# Patient Record
Sex: MALE | Race: WHITE | NOT HISPANIC OR LATINO | Employment: FULL TIME | URBAN - METROPOLITAN AREA
[De-identification: names, ages, dates, MRNs, and addresses within clinical notes are randomized per-mention and may not be internally consistent; named-entity substitution may affect disease eponyms.]

---

## 2017-01-12 ENCOUNTER — COMMUNICATION - HEALTHEAST (OUTPATIENT)
Dept: INTERNAL MEDICINE | Facility: CLINIC | Age: 27
End: 2017-01-12

## 2017-02-13 ENCOUNTER — COMMUNICATION - HEALTHEAST (OUTPATIENT)
Dept: INTERNAL MEDICINE | Facility: CLINIC | Age: 27
End: 2017-02-13

## 2017-03-13 ENCOUNTER — COMMUNICATION - HEALTHEAST (OUTPATIENT)
Dept: INTERNAL MEDICINE | Facility: CLINIC | Age: 27
End: 2017-03-13

## 2017-04-04 ENCOUNTER — COMMUNICATION - HEALTHEAST (OUTPATIENT)
Dept: INTERNAL MEDICINE | Facility: CLINIC | Age: 27
End: 2017-04-04

## 2017-05-05 ENCOUNTER — COMMUNICATION - HEALTHEAST (OUTPATIENT)
Dept: INTERNAL MEDICINE | Facility: CLINIC | Age: 27
End: 2017-05-05

## 2017-05-08 ENCOUNTER — COMMUNICATION - HEALTHEAST (OUTPATIENT)
Dept: INTERNAL MEDICINE | Facility: CLINIC | Age: 27
End: 2017-05-08

## 2017-06-05 ENCOUNTER — COMMUNICATION - HEALTHEAST (OUTPATIENT)
Dept: INTERNAL MEDICINE | Facility: CLINIC | Age: 27
End: 2017-06-05

## 2017-06-28 ENCOUNTER — COMMUNICATION - HEALTHEAST (OUTPATIENT)
Dept: INTERNAL MEDICINE | Facility: CLINIC | Age: 27
End: 2017-06-28

## 2017-07-24 ENCOUNTER — COMMUNICATION - HEALTHEAST (OUTPATIENT)
Dept: INTERNAL MEDICINE | Facility: CLINIC | Age: 27
End: 2017-07-24

## 2017-08-17 ENCOUNTER — COMMUNICATION - HEALTHEAST (OUTPATIENT)
Dept: INTERNAL MEDICINE | Facility: CLINIC | Age: 27
End: 2017-08-17

## 2017-09-13 ENCOUNTER — COMMUNICATION - HEALTHEAST (OUTPATIENT)
Dept: INTERNAL MEDICINE | Facility: CLINIC | Age: 27
End: 2017-09-13

## 2017-10-05 ENCOUNTER — COMMUNICATION - HEALTHEAST (OUTPATIENT)
Dept: INTERNAL MEDICINE | Facility: CLINIC | Age: 27
End: 2017-10-05

## 2017-11-06 ENCOUNTER — COMMUNICATION - HEALTHEAST (OUTPATIENT)
Dept: INTERNAL MEDICINE | Facility: CLINIC | Age: 27
End: 2017-11-06

## 2017-12-05 ENCOUNTER — COMMUNICATION - HEALTHEAST (OUTPATIENT)
Dept: INTERNAL MEDICINE | Facility: CLINIC | Age: 27
End: 2017-12-05

## 2017-12-11 ENCOUNTER — OFFICE VISIT - HEALTHEAST (OUTPATIENT)
Dept: INTERNAL MEDICINE | Facility: CLINIC | Age: 27
End: 2017-12-11

## 2017-12-11 DIAGNOSIS — I10 HTN (HYPERTENSION): ICD-10-CM

## 2017-12-11 DIAGNOSIS — F90.9 ADHD: ICD-10-CM

## 2017-12-11 RX ORDER — LISINOPRIL 10 MG/1
10 TABLET ORAL DAILY
Qty: 90 TABLET | Refills: 3 | Status: SHIPPED | OUTPATIENT
Start: 2017-12-11 | End: 2021-12-16

## 2017-12-11 ASSESSMENT — MIFFLIN-ST. JEOR: SCORE: 1685.41

## 2018-01-03 ENCOUNTER — COMMUNICATION - HEALTHEAST (OUTPATIENT)
Dept: INTERNAL MEDICINE | Facility: CLINIC | Age: 28
End: 2018-01-03

## 2018-01-30 ENCOUNTER — COMMUNICATION - HEALTHEAST (OUTPATIENT)
Dept: INTERNAL MEDICINE | Facility: CLINIC | Age: 28
End: 2018-01-30

## 2018-02-27 ENCOUNTER — COMMUNICATION - HEALTHEAST (OUTPATIENT)
Dept: INTERNAL MEDICINE | Facility: CLINIC | Age: 28
End: 2018-02-27

## 2018-03-26 ENCOUNTER — COMMUNICATION - HEALTHEAST (OUTPATIENT)
Dept: INTERNAL MEDICINE | Facility: CLINIC | Age: 28
End: 2018-03-26

## 2018-04-23 ENCOUNTER — COMMUNICATION - HEALTHEAST (OUTPATIENT)
Dept: INTERNAL MEDICINE | Facility: CLINIC | Age: 28
End: 2018-04-23

## 2018-05-17 ENCOUNTER — COMMUNICATION - HEALTHEAST (OUTPATIENT)
Dept: INTERNAL MEDICINE | Facility: CLINIC | Age: 28
End: 2018-05-17

## 2018-06-12 ENCOUNTER — COMMUNICATION - HEALTHEAST (OUTPATIENT)
Dept: INTERNAL MEDICINE | Facility: CLINIC | Age: 28
End: 2018-06-12

## 2018-07-09 ENCOUNTER — COMMUNICATION - HEALTHEAST (OUTPATIENT)
Dept: INTERNAL MEDICINE | Facility: CLINIC | Age: 28
End: 2018-07-09

## 2018-08-01 ENCOUNTER — COMMUNICATION - HEALTHEAST (OUTPATIENT)
Dept: INTERNAL MEDICINE | Facility: CLINIC | Age: 28
End: 2018-08-01

## 2018-08-06 ENCOUNTER — COMMUNICATION - HEALTHEAST (OUTPATIENT)
Dept: INTERNAL MEDICINE | Facility: CLINIC | Age: 28
End: 2018-08-06

## 2018-09-04 ENCOUNTER — COMMUNICATION - HEALTHEAST (OUTPATIENT)
Dept: INTERNAL MEDICINE | Facility: CLINIC | Age: 28
End: 2018-09-04

## 2018-10-01 ENCOUNTER — COMMUNICATION - HEALTHEAST (OUTPATIENT)
Dept: INTERNAL MEDICINE | Facility: CLINIC | Age: 28
End: 2018-10-01

## 2018-10-29 ENCOUNTER — COMMUNICATION - HEALTHEAST (OUTPATIENT)
Dept: INTERNAL MEDICINE | Facility: CLINIC | Age: 28
End: 2018-10-29

## 2018-11-27 ENCOUNTER — COMMUNICATION - HEALTHEAST (OUTPATIENT)
Dept: INTERNAL MEDICINE | Facility: CLINIC | Age: 28
End: 2018-11-27

## 2018-12-24 ENCOUNTER — COMMUNICATION - HEALTHEAST (OUTPATIENT)
Dept: INTERNAL MEDICINE | Facility: CLINIC | Age: 28
End: 2018-12-24

## 2019-01-17 ENCOUNTER — OFFICE VISIT - HEALTHEAST (OUTPATIENT)
Dept: INTERNAL MEDICINE | Facility: CLINIC | Age: 29
End: 2019-01-17

## 2019-01-17 DIAGNOSIS — F90.9 ATTENTION DEFICIT HYPERACTIVITY DISORDER (ADHD), UNSPECIFIED ADHD TYPE: ICD-10-CM

## 2019-01-17 DIAGNOSIS — Z79.899 MEDICATION MANAGEMENT: ICD-10-CM

## 2019-01-17 DIAGNOSIS — R03.0 ELEVATED BLOOD PRESSURE READING WITHOUT DIAGNOSIS OF HYPERTENSION: ICD-10-CM

## 2019-01-17 ASSESSMENT — MIFFLIN-ST. JEOR: SCORE: 1699.02

## 2019-01-21 LAB
AMPHETAMINE GC/MS CONF: 4678 NG/ML
INTERPRETATION: NORMAL
MDA (ECSTASY METABOLITE)-BY GC/MS: NEGATIVE NG/ML
MDMA (ECSTASY)-BY GC/MS: NEGATIVE NG/ML
METHAMPHETAMINE GC/MS CONF: NEGATIVE NG/ML
PHENTERMINE-BY GC/MS: NEGATIVE NG/ML
PSEUDOEPHEDRINE/EPHEDRINE-BY GC/MS: NEGATIVE NG/ML

## 2019-01-23 ENCOUNTER — COMMUNICATION - HEALTHEAST (OUTPATIENT)
Dept: INTERNAL MEDICINE | Facility: CLINIC | Age: 29
End: 2019-01-23

## 2019-01-23 LAB
6-MONOACETYLMORPHINE: NOT DETECTED NG/ML
AMPHETAMINES: ABNORMAL NG/ML
BARBITURATES: NEGATIVE NG/ML
BENZODIAZEPINES: NEGATIVE NG/ML
BUPRENORPHINE: NOT DETECTED NG/ML
COCAINE: NEGATIVE NG/ML
CODEINE-6-BETA-GLUCURONIDE: NOT DETECTED NG/ML
CODEINE: NOT DETECTED NG/ML
COMMENT:: NORMAL
CREATININE URINE: 54.6 MG/DL
DIHYDROCODEINE: NOT DETECTED NG/ML
EDDP: NOT DETECTED NG/ML
FENTANYL: NOT DETECTED NG/ML
HYDROCODONE: NOT DETECTED NG/ML
HYDROMORPHONE-3-BETA-GLUCURONIDE: NOT DETECTED NG/ML
HYDROMORPHONE: NOT DETECTED NG/ML
MEPERIDINE: NOT DETECTED NG/ML
METHADONE: NOT DETECTED NG/ML
MORPHINE-6-BETA-GLUCURONIDE: NOT DETECTED NG/ML
MORPHINE: NOT DETECTED NG/ML
N-DESMETHYLTAPENTADOL: NOT DETECTED NG/ML
NALOXONE-3-BETA-GLUCURONIDE: NOT DETECTED NG/ML
NALOXONE: NOT DETECTED NG/ML
NORBUPRENORPHINE GLUCURONIDE: NOT DETECTED NG/ML
NORBUPRENORPHINE: NOT DETECTED NG/ML
NORFENTANYL: NOT DETECTED NG/ML
NORHYDROCODONE: NOT DETECTED NG/ML
NORMEPERIDINE: NOT DETECTED NG/ML
NOROXYCODONE: NOT DETECTED NG/ML
NOROXYMORPHONE: NOT DETECTED NG/ML
NORPROPOXYPHENE: NOT DETECTED NG/ML
O-DESMETHYLTRAMADOL: NOT DETECTED NG/ML
OPIOID INTERPRETATION: ABNORMAL
OXIDANTS URINE: NEGATIVE
OXYCODONE: NOT DETECTED NG/ML
OXYMORPHONE-3-BETA-GLUCURONIDE: NOT DETECTED NG/ML
OXYMORPHONE: NOT DETECTED NG/ML
PH UR STRIP: 5.8 [PH] (ref 5–7)
PHENCYCLIDINE: NEGATIVE NG/ML
PROPOXYPHENE: NOT DETECTED NG/ML
SP GR UR STRIP: 1.01
TAPENTADOL-BETA-GLUCURONIDE: NOT DETECTED NG/ML
TAPENTADOL: NOT DETECTED NG/ML
THC METABOLITE, UR.: NEGATIVE NG/ML
TRAMADOL: NOT DETECTED NG/ML

## 2019-02-20 ENCOUNTER — COMMUNICATION - HEALTHEAST (OUTPATIENT)
Dept: INTERNAL MEDICINE | Facility: CLINIC | Age: 29
End: 2019-02-20

## 2019-03-18 ENCOUNTER — COMMUNICATION - HEALTHEAST (OUTPATIENT)
Dept: INTERNAL MEDICINE | Facility: CLINIC | Age: 29
End: 2019-03-18

## 2019-04-16 ENCOUNTER — COMMUNICATION - HEALTHEAST (OUTPATIENT)
Dept: INTERNAL MEDICINE | Facility: CLINIC | Age: 29
End: 2019-04-16

## 2019-04-16 DIAGNOSIS — F90.9 ATTENTION DEFICIT HYPERACTIVITY DISORDER (ADHD), UNSPECIFIED ADHD TYPE: ICD-10-CM

## 2019-05-14 ENCOUNTER — COMMUNICATION - HEALTHEAST (OUTPATIENT)
Dept: INTERNAL MEDICINE | Facility: CLINIC | Age: 29
End: 2019-05-14

## 2019-05-14 DIAGNOSIS — F90.9 ATTENTION DEFICIT HYPERACTIVITY DISORDER (ADHD), UNSPECIFIED ADHD TYPE: ICD-10-CM

## 2019-06-12 ENCOUNTER — COMMUNICATION - HEALTHEAST (OUTPATIENT)
Dept: INTERNAL MEDICINE | Facility: CLINIC | Age: 29
End: 2019-06-12

## 2019-06-12 DIAGNOSIS — F90.9 ATTENTION DEFICIT HYPERACTIVITY DISORDER (ADHD), UNSPECIFIED ADHD TYPE: ICD-10-CM

## 2019-07-09 ENCOUNTER — COMMUNICATION - HEALTHEAST (OUTPATIENT)
Dept: INTERNAL MEDICINE | Facility: CLINIC | Age: 29
End: 2019-07-09

## 2019-07-09 DIAGNOSIS — F90.9 ATTENTION DEFICIT HYPERACTIVITY DISORDER (ADHD), UNSPECIFIED ADHD TYPE: ICD-10-CM

## 2019-08-06 ENCOUNTER — COMMUNICATION - HEALTHEAST (OUTPATIENT)
Dept: INTERNAL MEDICINE | Facility: CLINIC | Age: 29
End: 2019-08-06

## 2019-08-07 ENCOUNTER — COMMUNICATION - HEALTHEAST (OUTPATIENT)
Dept: INTERNAL MEDICINE | Facility: CLINIC | Age: 29
End: 2019-08-07

## 2019-08-07 DIAGNOSIS — F90.9 ATTENTION DEFICIT HYPERACTIVITY DISORDER (ADHD), UNSPECIFIED ADHD TYPE: ICD-10-CM

## 2019-08-08 ENCOUNTER — COMMUNICATION - HEALTHEAST (OUTPATIENT)
Dept: SCHEDULING | Facility: CLINIC | Age: 29
End: 2019-08-08

## 2019-09-06 ENCOUNTER — COMMUNICATION - HEALTHEAST (OUTPATIENT)
Dept: INTERNAL MEDICINE | Facility: CLINIC | Age: 29
End: 2019-09-06

## 2019-09-06 DIAGNOSIS — F90.9 ATTENTION DEFICIT HYPERACTIVITY DISORDER (ADHD), UNSPECIFIED ADHD TYPE: ICD-10-CM

## 2019-09-09 ENCOUNTER — COMMUNICATION - HEALTHEAST (OUTPATIENT)
Dept: INTERNAL MEDICINE | Facility: CLINIC | Age: 29
End: 2019-09-09

## 2019-09-09 DIAGNOSIS — F90.9 ATTENTION DEFICIT HYPERACTIVITY DISORDER (ADHD), UNSPECIFIED ADHD TYPE: ICD-10-CM

## 2019-10-29 ENCOUNTER — COMMUNICATION - HEALTHEAST (OUTPATIENT)
Dept: SCHEDULING | Facility: CLINIC | Age: 29
End: 2019-10-29

## 2019-10-29 DIAGNOSIS — F90.9 ATTENTION DEFICIT HYPERACTIVITY DISORDER (ADHD), UNSPECIFIED ADHD TYPE: ICD-10-CM

## 2019-11-26 ENCOUNTER — COMMUNICATION - HEALTHEAST (OUTPATIENT)
Dept: INTERNAL MEDICINE | Facility: CLINIC | Age: 29
End: 2019-11-26

## 2019-11-26 DIAGNOSIS — F90.9 ATTENTION DEFICIT HYPERACTIVITY DISORDER (ADHD), UNSPECIFIED ADHD TYPE: ICD-10-CM

## 2019-12-24 ENCOUNTER — COMMUNICATION - HEALTHEAST (OUTPATIENT)
Dept: SCHEDULING | Facility: CLINIC | Age: 29
End: 2019-12-24

## 2019-12-24 DIAGNOSIS — F90.9 ATTENTION DEFICIT HYPERACTIVITY DISORDER (ADHD), UNSPECIFIED ADHD TYPE: ICD-10-CM

## 2020-01-20 ENCOUNTER — COMMUNICATION - HEALTHEAST (OUTPATIENT)
Dept: INTERNAL MEDICINE | Facility: CLINIC | Age: 30
End: 2020-01-20

## 2020-01-20 DIAGNOSIS — F90.9 ATTENTION DEFICIT HYPERACTIVITY DISORDER (ADHD), UNSPECIFIED ADHD TYPE: ICD-10-CM

## 2020-01-22 ENCOUNTER — COMMUNICATION - HEALTHEAST (OUTPATIENT)
Dept: INTERNAL MEDICINE | Facility: CLINIC | Age: 30
End: 2020-01-22

## 2020-01-22 DIAGNOSIS — F90.9 ATTENTION DEFICIT HYPERACTIVITY DISORDER (ADHD), UNSPECIFIED ADHD TYPE: ICD-10-CM

## 2020-02-20 ENCOUNTER — COMMUNICATION - HEALTHEAST (OUTPATIENT)
Dept: INTERNAL MEDICINE | Facility: CLINIC | Age: 30
End: 2020-02-20

## 2020-02-20 DIAGNOSIS — F90.9 ATTENTION DEFICIT HYPERACTIVITY DISORDER (ADHD), UNSPECIFIED ADHD TYPE: ICD-10-CM

## 2020-03-16 ENCOUNTER — COMMUNICATION - HEALTHEAST (OUTPATIENT)
Dept: INTERNAL MEDICINE | Facility: CLINIC | Age: 30
End: 2020-03-16

## 2020-03-16 DIAGNOSIS — F90.9 ATTENTION DEFICIT HYPERACTIVITY DISORDER (ADHD), UNSPECIFIED ADHD TYPE: ICD-10-CM

## 2020-04-15 ENCOUNTER — COMMUNICATION - HEALTHEAST (OUTPATIENT)
Dept: INTERNAL MEDICINE | Facility: CLINIC | Age: 30
End: 2020-04-15

## 2020-04-15 DIAGNOSIS — F90.9 ATTENTION DEFICIT HYPERACTIVITY DISORDER (ADHD), UNSPECIFIED ADHD TYPE: ICD-10-CM

## 2020-04-16 ENCOUNTER — OFFICE VISIT - HEALTHEAST (OUTPATIENT)
Dept: INTERNAL MEDICINE | Facility: CLINIC | Age: 30
End: 2020-04-16

## 2020-04-16 ENCOUNTER — COMMUNICATION - HEALTHEAST (OUTPATIENT)
Dept: INTERNAL MEDICINE | Facility: CLINIC | Age: 30
End: 2020-04-16

## 2020-04-16 DIAGNOSIS — R21 RASH AND NONSPECIFIC SKIN ERUPTION: ICD-10-CM

## 2020-04-16 DIAGNOSIS — I10 ESSENTIAL HYPERTENSION: ICD-10-CM

## 2020-04-16 DIAGNOSIS — F90.9 ATTENTION DEFICIT HYPERACTIVITY DISORDER (ADHD), UNSPECIFIED ADHD TYPE: ICD-10-CM

## 2020-04-16 LAB
AMPHETAMINES UR QL SCN: ABNORMAL
BARBITURATES UR QL: ABNORMAL
BENZODIAZ UR QL: ABNORMAL
CANNABINOIDS UR QL SCN: ABNORMAL
COCAINE UR QL: ABNORMAL
CREAT UR-MCNC: 122.8 MG/DL
OPIATES UR QL SCN: ABNORMAL
OXYCODONE UR QL: ABNORMAL
PCP UR QL SCN: ABNORMAL

## 2020-05-15 ENCOUNTER — COMMUNICATION - HEALTHEAST (OUTPATIENT)
Dept: INTERNAL MEDICINE | Facility: CLINIC | Age: 30
End: 2020-05-15

## 2020-05-15 DIAGNOSIS — F90.9 ATTENTION DEFICIT HYPERACTIVITY DISORDER (ADHD), UNSPECIFIED ADHD TYPE: ICD-10-CM

## 2020-06-16 ENCOUNTER — COMMUNICATION - HEALTHEAST (OUTPATIENT)
Dept: INTERNAL MEDICINE | Facility: CLINIC | Age: 30
End: 2020-06-16

## 2020-06-16 DIAGNOSIS — F90.9 ATTENTION DEFICIT HYPERACTIVITY DISORDER (ADHD), UNSPECIFIED ADHD TYPE: ICD-10-CM

## 2020-07-14 ENCOUNTER — COMMUNICATION - HEALTHEAST (OUTPATIENT)
Dept: SCHEDULING | Facility: CLINIC | Age: 30
End: 2020-07-14

## 2020-07-14 DIAGNOSIS — F90.9 ATTENTION DEFICIT HYPERACTIVITY DISORDER (ADHD), UNSPECIFIED ADHD TYPE: ICD-10-CM

## 2020-08-13 ENCOUNTER — COMMUNICATION - HEALTHEAST (OUTPATIENT)
Dept: INTERNAL MEDICINE | Facility: CLINIC | Age: 30
End: 2020-08-13

## 2020-08-13 DIAGNOSIS — F90.9 ATTENTION DEFICIT HYPERACTIVITY DISORDER (ADHD), UNSPECIFIED ADHD TYPE: ICD-10-CM

## 2020-09-14 ENCOUNTER — COMMUNICATION - HEALTHEAST (OUTPATIENT)
Dept: INTERNAL MEDICINE | Facility: CLINIC | Age: 30
End: 2020-09-14

## 2020-09-14 DIAGNOSIS — F90.9 ATTENTION DEFICIT HYPERACTIVITY DISORDER (ADHD), UNSPECIFIED ADHD TYPE: ICD-10-CM

## 2020-10-13 ENCOUNTER — COMMUNICATION - HEALTHEAST (OUTPATIENT)
Dept: INTERNAL MEDICINE | Facility: CLINIC | Age: 30
End: 2020-10-13

## 2020-10-13 DIAGNOSIS — F90.9 ATTENTION DEFICIT HYPERACTIVITY DISORDER (ADHD), UNSPECIFIED ADHD TYPE: ICD-10-CM

## 2020-11-12 ENCOUNTER — COMMUNICATION - HEALTHEAST (OUTPATIENT)
Dept: INTERNAL MEDICINE | Facility: CLINIC | Age: 30
End: 2020-11-12

## 2020-11-12 DIAGNOSIS — F90.9 ATTENTION DEFICIT HYPERACTIVITY DISORDER (ADHD), UNSPECIFIED ADHD TYPE: ICD-10-CM

## 2020-12-14 ENCOUNTER — COMMUNICATION - HEALTHEAST (OUTPATIENT)
Dept: INTERNAL MEDICINE | Facility: CLINIC | Age: 30
End: 2020-12-14

## 2020-12-14 DIAGNOSIS — F90.9 ATTENTION DEFICIT HYPERACTIVITY DISORDER (ADHD), UNSPECIFIED ADHD TYPE: ICD-10-CM

## 2020-12-15 ENCOUNTER — OFFICE VISIT - HEALTHEAST (OUTPATIENT)
Dept: FAMILY MEDICINE | Facility: CLINIC | Age: 30
End: 2020-12-15

## 2020-12-15 DIAGNOSIS — F90.9 ATTENTION DEFICIT HYPERACTIVITY DISORDER (ADHD), UNSPECIFIED ADHD TYPE: ICD-10-CM

## 2020-12-15 DIAGNOSIS — R21 RASH AND NONSPECIFIC SKIN ERUPTION: ICD-10-CM

## 2020-12-15 RX ORDER — TRIAMCINOLONE ACETONIDE 1 MG/G
CREAM TOPICAL
Qty: 30 G | Refills: 2 | Status: SHIPPED | OUTPATIENT
Start: 2020-12-15

## 2021-01-15 ENCOUNTER — COMMUNICATION - HEALTHEAST (OUTPATIENT)
Dept: ADMINISTRATIVE | Facility: CLINIC | Age: 31
End: 2021-01-15

## 2021-01-15 DIAGNOSIS — F90.9 ATTENTION DEFICIT HYPERACTIVITY DISORDER (ADHD), UNSPECIFIED ADHD TYPE: ICD-10-CM

## 2021-01-16 ENCOUNTER — COMMUNICATION - HEALTHEAST (OUTPATIENT)
Dept: SCHEDULING | Facility: CLINIC | Age: 31
End: 2021-01-16

## 2021-01-18 ENCOUNTER — COMMUNICATION - HEALTHEAST (OUTPATIENT)
Dept: INTERNAL MEDICINE | Facility: CLINIC | Age: 31
End: 2021-01-18

## 2021-01-18 DIAGNOSIS — F90.9 ATTENTION DEFICIT HYPERACTIVITY DISORDER (ADHD), UNSPECIFIED ADHD TYPE: ICD-10-CM

## 2021-02-15 ENCOUNTER — COMMUNICATION - HEALTHEAST (OUTPATIENT)
Dept: INTERNAL MEDICINE | Facility: CLINIC | Age: 31
End: 2021-02-15

## 2021-02-15 ENCOUNTER — COMMUNICATION - HEALTHEAST (OUTPATIENT)
Dept: FAMILY MEDICINE | Facility: CLINIC | Age: 31
End: 2021-02-15

## 2021-02-15 DIAGNOSIS — F90.9 ATTENTION DEFICIT HYPERACTIVITY DISORDER (ADHD), UNSPECIFIED ADHD TYPE: ICD-10-CM

## 2021-02-25 ENCOUNTER — COMMUNICATION - HEALTHEAST (OUTPATIENT)
Dept: FAMILY MEDICINE | Facility: CLINIC | Age: 31
End: 2021-02-25

## 2021-03-16 ENCOUNTER — COMMUNICATION - HEALTHEAST (OUTPATIENT)
Dept: INTERNAL MEDICINE | Facility: CLINIC | Age: 31
End: 2021-03-16

## 2021-03-16 ENCOUNTER — COMMUNICATION - HEALTHEAST (OUTPATIENT)
Dept: ADMINISTRATIVE | Facility: CLINIC | Age: 31
End: 2021-03-16

## 2021-03-16 DIAGNOSIS — F90.9 ATTENTION DEFICIT HYPERACTIVITY DISORDER (ADHD), UNSPECIFIED ADHD TYPE: ICD-10-CM

## 2021-04-15 ENCOUNTER — COMMUNICATION - HEALTHEAST (OUTPATIENT)
Dept: INTERNAL MEDICINE | Facility: CLINIC | Age: 31
End: 2021-04-15

## 2021-04-15 DIAGNOSIS — F90.9 ATTENTION DEFICIT HYPERACTIVITY DISORDER (ADHD), UNSPECIFIED ADHD TYPE: ICD-10-CM

## 2021-05-07 ENCOUNTER — COMMUNICATION - HEALTHEAST (OUTPATIENT)
Dept: INTERNAL MEDICINE | Facility: CLINIC | Age: 31
End: 2021-05-07

## 2021-05-07 DIAGNOSIS — Z00.00 ROUTINE HEALTH MAINTENANCE: ICD-10-CM

## 2021-05-10 ENCOUNTER — AMBULATORY - HEALTHEAST (OUTPATIENT)
Dept: NURSING | Facility: CLINIC | Age: 31
End: 2021-05-10

## 2021-05-17 ENCOUNTER — COMMUNICATION - HEALTHEAST (OUTPATIENT)
Dept: FAMILY MEDICINE | Facility: CLINIC | Age: 31
End: 2021-05-17

## 2021-05-18 ENCOUNTER — COMMUNICATION - HEALTHEAST (OUTPATIENT)
Dept: INTERNAL MEDICINE | Facility: CLINIC | Age: 31
End: 2021-05-18

## 2021-05-18 DIAGNOSIS — F90.9 ATTENTION DEFICIT HYPERACTIVITY DISORDER (ADHD), UNSPECIFIED ADHD TYPE: ICD-10-CM

## 2021-05-20 ENCOUNTER — RECORDS - HEALTHEAST (OUTPATIENT)
Dept: ADMINISTRATIVE | Facility: OTHER | Age: 31
End: 2021-05-20

## 2021-05-20 ENCOUNTER — OFFICE VISIT - HEALTHEAST (OUTPATIENT)
Dept: FAMILY MEDICINE | Facility: CLINIC | Age: 31
End: 2021-05-20

## 2021-05-20 DIAGNOSIS — Z79.899 CONTROLLED SUBSTANCE AGREEMENT SIGNED: ICD-10-CM

## 2021-05-20 DIAGNOSIS — F90.9 ATTENTION DEFICIT HYPERACTIVITY DISORDER (ADHD), UNSPECIFIED ADHD TYPE: ICD-10-CM

## 2021-05-20 DIAGNOSIS — I10 BENIGN ESSENTIAL HYPERTENSION: ICD-10-CM

## 2021-05-20 DIAGNOSIS — Z11.4 SCREENING FOR HIV (HUMAN IMMUNODEFICIENCY VIRUS): ICD-10-CM

## 2021-05-20 DIAGNOSIS — Z11.59 NEED FOR HEPATITIS C SCREENING TEST: ICD-10-CM

## 2021-05-20 LAB
AMPHETAMINES UR QL SCN: ABNORMAL
BARBITURATES UR QL: ABNORMAL
BENZODIAZ UR QL: ABNORMAL
CANNABINOIDS UR QL SCN: ABNORMAL
COCAINE UR QL: ABNORMAL
CREAT UR-MCNC: 17.8 MG/DL
METHADONE UR QL SCN: ABNORMAL
OPIATES UR QL SCN: ABNORMAL
OXYCODONE UR QL: ABNORMAL
PCP UR QL SCN: ABNORMAL

## 2021-05-20 RX ORDER — DEXTROAMPHETAMINE SACCHARATE, AMPHETAMINE ASPARTATE MONOHYDRATE, DEXTROAMPHETAMINE SULFATE AND AMPHETAMINE SULFATE 7.5; 7.5; 7.5; 7.5 MG/1; MG/1; MG/1; MG/1
30 CAPSULE, EXTENDED RELEASE ORAL DAILY
Qty: 30 CAPSULE | Refills: 0 | Status: SHIPPED | OUTPATIENT
Start: 2021-05-28 | End: 2021-07-02

## 2021-05-20 RX ORDER — DEXTROAMPHETAMINE SACCHARATE, AMPHETAMINE ASPARTATE, DEXTROAMPHETAMINE SULFATE AND AMPHETAMINE SULFATE 1.25; 1.25; 1.25; 1.25 MG/1; MG/1; MG/1; MG/1
5 TABLET ORAL DAILY
Qty: 30 TABLET | Refills: 0 | Status: SHIPPED | OUTPATIENT
Start: 2021-05-28 | End: 2021-07-02

## 2021-05-20 RX ORDER — AMLODIPINE BESYLATE 10 MG/1
10 TABLET ORAL DAILY
Qty: 30 TABLET | Refills: 1 | Status: SHIPPED | OUTPATIENT
Start: 2021-05-20 | End: 2021-12-16 | Stop reason: SINTOL

## 2021-05-27 VITALS
TEMPERATURE: 98.2 F | OXYGEN SATURATION: 98 % | SYSTOLIC BLOOD PRESSURE: 150 MMHG | HEART RATE: 93 BPM | DIASTOLIC BLOOD PRESSURE: 88 MMHG

## 2021-05-27 VITALS — WEIGHT: 173.1 LBS

## 2021-05-27 NOTE — TELEPHONE ENCOUNTER
Received a vocera call regarding this patient- he is out of his adderal and wanted to know if he could get it today.     Advised that PCP is not here today- will not send to covering provider as this is an early fill request- last filled on 3/22/19- should have enough to last through 4/20/19.   PCP to advise tomorrow

## 2021-05-27 NOTE — TELEPHONE ENCOUNTER
Controlled Substance Refill Request  Medication Name:   Requested Prescriptions     Pending Prescriptions Disp Refills     dextroamphetamine-amphetamine (ADDERALL) 30 mg Tab 30 tablet 0     Sig: Take 30 mg by mouth daily.     dextroamphetamine-amphetamine (ADDERALL) 5 mg Tab tablet 30 tablet 0     Sig: Take 1 tablet by mouth daily.     Date Last Fill:  3/22/2019  [facility]     Submit electronically to pharmacy  Controlled Substance Agreement Date Scanned:   Encounter-Level CSA Scan Date - 12/11/2017:    Scan on 12/15/2017  7:21 AM (below)             Encounter-Level CSA Scan Date - 09/22/2016:    Scan on 9/26/2016  1:38 PM (below)             Encounter-Level CSA Scan Date - 11/05/2015:    Scan on 11/9/2015  1:40 PM (below)         Last office visit with prescriber/PCP: 1/17/2019 Pantera Rooney MD OR same dept: 1/17/2019 Pantera Rooney MD OR same specialty: 1/17/2019 Pantera Rooney MD  Last physical: Visit date not found Last MTM visit: Visit date not found

## 2021-05-28 NOTE — TELEPHONE ENCOUNTER
Who is calling:  Patient   Reason for Call:  Called in yesterday hoping the orders could be signed overnight. Please review.   Date of last appointment with primary care: NA  Okay to leave a detailed message: No

## 2021-05-28 NOTE — TELEPHONE ENCOUNTER
Controlled Substance Refill Request  Medication:   Requested Prescriptions     Pending Prescriptions Disp Refills     dextroamphetamine-amphetamine (ADDERALL) 30 mg Tab 30 tablet 0     Sig: Take 30 mg by mouth daily.     dextroamphetamine-amphetamine (ADDERALL) 5 mg Tab tablet 30 tablet 0     Sig: Take 1 tablet by mouth daily.     Date Last Fill: 4/17/19  [id number]   Submit electronically to pharmacy  Controlled Substance Agreement on File:   Encounter-Level CSA Scan Date - 12/11/2017:    Scan on 12/15/2017  7:21 AM (below)             Encounter-Level CSA Scan Date - 09/22/2016:    Scan on 9/26/2016  1:38 PM (below)             Encounter-Level CSA Scan Date - 11/05/2015:    Scan on 11/9/2015  1:40 PM (below)         Last office visit: Last office visit pertaining to requested medication was 1/17/19.

## 2021-05-28 NOTE — TELEPHONE ENCOUNTER
Refill Request  Did you contact pharmacy: Yes  Medication name: Adderall 30 mg tab and Adderall 5 mg   Requested Prescriptions      No prescriptions requested or ordered in this encounter     Who prescribed the medication: Dr. Rooney  [facility]  Is patient out of medication: No.  3  days left  Patient notified refills processed in 72 hours:  yes  Okay to leave a detailed message: yes

## 2021-05-29 NOTE — TELEPHONE ENCOUNTER
Prescription Monitoring Program activity reviewed with no discrepancies noted.      Last fill per : 5/15  Quantity/days supply: 30/30    Controlled Substance Agreement on file: Yes  Date: 1/17/19    Last office visit with provider:  1/17/2019 Pantera Rooney MD    Please advise.  Fernanda Dickerson CMA ([facility])

## 2021-05-29 NOTE — TELEPHONE ENCOUNTER
Patient has two tablets left.        Controlled Substance Refill Request  Medication Name:   Requested Prescriptions     Pending Prescriptions Disp Refills     dextroamphetamine-amphetamine (ADDERALL) 30 mg Tab 30 tablet 0     Sig: Take 30 mg by mouth daily.     dextroamphetamine-amphetamine (ADDERALL) 5 mg Tab tablet 30 tablet 0     Sig: Take 1 tablet by mouth daily.     Date Last Fill: 5/15/19  [id number]    Submit electronically to pharmacy  Controlled Substance Agreement Date Scanned:   Encounter-Level CSA Scan Date - 12/11/2017:    Scan on 12/15/2017  7:21 AM (below)             Encounter-Level CSA Scan Date - 09/22/2016:    Scan on 9/26/2016  1:38 PM (below)             Encounter-Level CSA Scan Date - 11/05/2015:    Scan on 11/9/2015  1:40 PM (below)         Last office visit with prescriber/PCP: 1/17/2019 Pantera Rooney MD OR same dept: 1/17/2019 Pantera Rooney MD OR same specialty: 1/17/2019 Pantera Rooney MD  Last physical: Visit date not found Last MTM visit: Visit date not found

## 2021-05-30 NOTE — TELEPHONE ENCOUNTER
Prescription Monitoring Program activity reviewed with no discrepancies noted.      Last fill per : 6/12/19  Quantity/days supply: #30, 30 day supply    Controlled Substance Agreement on file: No  Date:     Last office visit with provider:  1/17/2019 Pantera Rooney MD    Please advise.    Cynthia Granger LPN

## 2021-05-30 NOTE — TELEPHONE ENCOUNTER
Controlled Substance Refill Request  Medication Name:   Requested Prescriptions     Pending Prescriptions Disp Refills     dextroamphetamine-amphetamine (ADDERALL) 30 mg Tab 30 tablet 0     Sig: Take 30 mg by mouth daily.     dextroamphetamine-amphetamine (ADDERALL) 5 mg Tab tablet 30 tablet 0     Sig: Take 1 tablet by mouth daily.     Date Last Fill: 6/12/2019  Pharmacy: [facility]      Submit electronically to pharmacy  Controlled Substance Agreement Date Scanned:   Encounter-Level CSA Scan Date - 12/11/2017:    Scan on 12/15/2017  7:21 AM (below)             Encounter-Level CSA Scan Date - 09/22/2016:    Scan on 9/26/2016  1:38 PM (below)             Encounter-Level CSA Scan Date - 11/05/2015:    Scan on 11/9/2015  1:40 PM (below)         Last office visit with prescriber/PCP: 1/17/2019 Pantera Rooney MD OR same dept: 1/17/2019 Pantera Rooney MD OR same specialty: 1/17/2019 Pantera Rooney MD  Last physical: Visit date not found Last MTM visit: Visit date not found

## 2021-05-31 VITALS — BODY MASS INDEX: 22.26 KG/M2 | HEIGHT: 71 IN | WEIGHT: 159 LBS

## 2021-05-31 NOTE — TELEPHONE ENCOUNTER
RN Assessment/Reason for Call:   Okay to leave Detailed Message  Patient calling in, script sent to [facility] ;on vacation in  [place].  Dr Rooney sent new script to [facility] pharmacy in [place]?  Nurse unable to see where script was sent to today..    [facility] [address] [facility] [address] [phone]  Wants it sent to if we are able to send to [place].  If not; plans to fill local and have parents  and mail it to him.    RN Action/Disposition:  Will send message to Dr Rooney  Agrees to plan.     Diana Patel RN    [facility] Triage/med refill  8/8/2019  6:08 PM

## 2021-05-31 NOTE — TELEPHONE ENCOUNTER
Linda Rodas for refill requested?  Refill has been set up for you to review.    Prescription Monitoring Program activity reviewed with no discrepancies noted.      Last fill per : 07/10/19  Quantity/days supply: Adderall 5 mg #30 for a 30 day supply, Adderall 30 mg #30 for 30 day supply     Controlled Substance Agreement on file: Yes  Date: 12/15/17    Last office visit with provider:  1/17/2019 Pantera Rooney MD    Please advise.    Michelle PORTILLO CMA/MIRIAN....................10:16 AM

## 2021-05-31 NOTE — TELEPHONE ENCOUNTER
Medication Question or Clarification  Who is calling: Patient  What medication are you calling about? (include dose and sig)    Disp Refills Start End    dextroamphetamine-amphetamine (ADDERALL) 30 mg Tab 30 tablet 0 7/10/2019 7/9/2020    Sig - Route: Take 30 mg by mouth daily. - Oral    Sent to pharmacy as: dextroamphetamine-amphetamine (ADDERALL) 30 mg Tab    Earliest Fill Date: 7/10/2019       Disp Refills Start End    dextroamphetamine-amphetamine (ADDERALL) 5 mg Tab tablet 30 tablet 0 7/10/2019 7/9/2020    Sig - Route: Take 1 tablet by mouth daily. - Oral    Sent to pharmacy as: dextroamphetamine-amphetamine (ADDERALL) 5 mg Tab tablet    Earliest Fill Date: 7/10/2019    Notes to Pharmacy: Take along with 30mg tab to equal 35 mg daily      Who prescribed the medication?: Pantera Rooney MD  What is your question/concern?: Patient understands he is calling early regarding his medications.  Patient states he is out of town on a family vacation and will need his medication refilled before returning home on 8/15/19.  Patient is questioning if his refills can be sent to a pharmacy in [place] near where he is staying?  Pharmacy: [facility], [place] ([address])  Okay to leave a detailed message?: Yes  [phone]  Site CMT - Please call the pharmacy to obtain any additional needed information.

## 2021-05-31 NOTE — TELEPHONE ENCOUNTER
Please OK to go to his local pharmacy, can not be filled in AZEB Dickerson CMA ([facility])

## 2021-05-31 NOTE — TELEPHONE ENCOUNTER
Are you Ok with the change?  Did put it for here.    Fernanda Dickerson CMA ([facility])

## 2021-05-31 NOTE — TELEPHONE ENCOUNTER
Medication Question or Clarification  Who is calling: Patient  What medication are you calling about? (include dose and sig)     dextroamphetamine-amphetamine (ADDERALL) 30 mg Tab 30 tablet 0 8/7/2019 8/6/2020    Sig - Route: Take 30 mg by mouth daily. - Oral    Sent to pharmacy as: dextroamphetamine-amphetamine (ADDERALL) 30 mg Tab    Earliest Fill Date: 8/7/2019    Notes to Pharmacy: Take along with 5 mg to equal 35 mg daily    E-Prescribing Status: Receipt confirmed by pharmacy (8/7/2019 10:27 AM CDT)        Who prescribed the medication?: Dr. Rooney  What is your question/concern?: The patient is unable to get his script from [facility]/pharmacy #[id number] - [place], [place] - [address]  and would like the script sent to [facility] #[id number] - [address] AT [place] [address] & [facility]. He states that they would not fill it due to the doctor being from out of state.   Pharmacy: [facility] #[id number] [address] AT [place] [address] & [address]   Okay to leave a detailed message?: Yes  Site CMT - Please call the pharmacy to obtain any additional needed information.

## 2021-05-31 NOTE — TELEPHONE ENCOUNTER
Spoke with the patient and relayed message below from Dr. Rooney.  Patient will check with the pharmacy in [place] and get back to us with their response regarding a prescription.    Michelle PORTILLO CMA/MIRIAN....................9:38 AM

## 2021-05-31 NOTE — TELEPHONE ENCOUNTER
Who is calling:  Patient  Reason for Call:    Patient states [facility] in [place], [place] will accept the below prescripitons.  [facility]   [address]  [facility] [address]  [phone]  Date of last appointment with primary care: 1/17/2019  Okay to leave a detailed message: Yes

## 2021-05-31 NOTE — TELEPHONE ENCOUNTER
I would be okay with that but I am not sure that a [place] pharmacy is going to accept a prescription from us for this medication.

## 2021-06-01 NOTE — TELEPHONE ENCOUNTER
Prescription Monitoring Program activity reviewed with no discrepancies noted.      Last fill per : 8/9  Quantity/days supply: 30/30    Controlled Substance Agreement on file: Yes  Date: 1/17/19    Last office visit with provider:  1/17/2019 Pantera Rooney MD    Please advise.    Fernanda Dickerson CMA ([facility])

## 2021-06-01 NOTE — TELEPHONE ENCOUNTER
Prescription set up in a separate encounter to be signed. Patient has been notified.    Brittni Allison, CMA

## 2021-06-01 NOTE — TELEPHONE ENCOUNTER
Medication Question or Clarification  Who is calling: Patient  What medication are you calling about? (include dose and sig)    Disp Refills Start End    dextroamphetamine-amphetamine (ADDERALL) 30 mg Tab 30 tablet 0 9/6/2019 9/5/2020    Sig - Route: Take 30 mg by mouth daily. - Oral    Sent to pharmacy as: dextroamphetamine-amphetamine (ADDERALL) 30 mg Tab    Earliest Fill Date: 9/6/2019    Notes to Pharmacy: Take along with 5 mg to equal 35 mg daily    E-Prescribing Status: Receipt confirmed by pharmacy (9/6/2019  4:41 PM CDT)      Who prescribed the medication?: Pantera Rooney MD   What is your question/concern?: Patient stated he would like to be prescribed the XR capsule instead of the immediate release because of the shortage from the other pharmacies.  [id number]  Okay to leave a detailed message?: Yes  Site CMT - Please call the pharmacy to obtain any additional needed information.

## 2021-06-01 NOTE — TELEPHONE ENCOUNTER
Waiting on the response from pharmacist.  was done on this medication.    Prescription Monitoring Program activity reviewed with no discrepancies noted.      Last fill per : 08/09/19  Quantity/days supply: #30 for a 30 day supply    Controlled Substance Agreement on file: No  Date:     Last office visit with provider:  1/17/2019 Pantera Rooney MD    Please advise.

## 2021-06-01 NOTE — TELEPHONE ENCOUNTER
I assume he is talking about a long-term change rather than short term change.  If that is the case I am okay with it but would ask that you check with David to adjust the dose to the XR form.

## 2021-06-01 NOTE — TELEPHONE ENCOUNTER
Medication Question or Clarification  Who is calling: Patient  What medication are you calling about? (include dose and sig)    Disp Refills Start End    dextroamphetamine-amphetamine (ADDERALL) 30 mg Tab 30 tablet 0 9/6/2019 9/5/2020    Sig - Route: Take 30 mg by mouth daily. - Oral    Sent to pharmacy as: dextroamphetamine-amphetamine (ADDERALL) 30 mg Tab    Earliest Fill Date: 9/6/2019    Notes to Pharmacy: Take along with 5 mg to equal 35 mg daily    E-Prescribing Status: Receipt confirmed by pharmacy (9/6/2019  4:41 PM CDT)      Who prescribed the medication?: Pantera Rooney MD   What is your question/concern?: patient stated the extended release medication needs to be sent to a different pharmacy from where it was originally sent.  Pharmacy: [facility] ([facility])  Okay to leave a detailed message?: Yes  Patient is requesting a call when this has been addressed so he may follow up with the pharmacy.  Site CMT - Please call the pharmacy to obtain any additional needed information.

## 2021-06-01 NOTE — TELEPHONE ENCOUNTER
Dr. Rooney,    Current RX is on recall. Please advise if you'd like to send in an alternative or have patient find RX at another pharmacy.    Michelle GOFF LPN .......... 4:05 PM  09/06/19    
I would rather he not change medication.  
Medication Request  Medication name:   dextroamphetamine-amphetamine (ADDERALL) 30 mg Tab 30 tablet 0 9/6/2019 9/5/2020 No   Sig - Route: Take 30 mg by mouth daily. - Oral   Patient requesting alternative or to have prescription sent to another Pharmacy  Pharmacy Name and Location: [facility])  Reason for request: This formulation and dose is on a recall.  When did you use medication last?:  1.17.2019  Patient offered appointment:  patient declined  Okay to leave a detailed message: yes    
Spoke with the patient and relayed message below from Dr. Rooney.  Patient asked if we would be able to send refill to the [facility] in [place]. Refill request was sent up in a separate encounter for Dr. Rooney to review.  He has authorized the prescription as requested.    Called the patient and let him know that the prescription was sent to the requested pharmacy.  He verbalized understanding and had no further questions at this time.  Michelle PORTILLO CMA/MIRIAN....................4:54 PM    
(3) adequate

## 2021-06-01 NOTE — TELEPHONE ENCOUNTER
Patient is out of medication and has been for 3 days.  Please send this prescriptoin as soon as possible.   Please call Sergio when this is done.        Medication Request  Medication name: Adderall XR 30 mg, one daily  Pharmacy Name and Location: [facility]  Reason for request: Patient had to change medication due to shortage of Adderall 30 mg.  When did you use medication last?:  3 days ago  Patient offered appointment:  patient declined  Okay to leave a detailed message: yes    There is a string of messages regarding this issue.  Patient requested Adderal 30 mg last week.  The Rx was sent but the pharmacy was out of stock.  The Rx was then sent to another pharmacy but they were out of stock.  Patient requested the Adderall XR 30 mg which pharmacy does have.      Dr. Rooney wanted Swapnil Livingston to weigh on this medication change.  Her note is as follows:  There is a different kinetic profile between the immediate release and extended release forms of Adderall, I do not see any specific concerns about switching this for his 30 mg dose, however does he use the smaller 5 mg immediate release dose in the afternoon?  He may benefit from XR formulation of the 30 mg Adderall and remain on the 5 mg immediate release if he takes this in the afternoon.  On the extended release formulation he also may not require immediate release 5 mg tab to prevent afternoon crashing.     Patient was given the information from Swapnil regarding he may not need the immediate release 5 mg tablet.  Patient understand this.    Patient is frustrated and hopes that Dr. Rooney will send in an order today for Adderall XR 30 mg, one daily to [facility] in [place].

## 2021-06-01 NOTE — TELEPHONE ENCOUNTER
Patient will be out of this medication on 9/08/2019 and is asking for it to be sent in today.        Controlled Substance Refill Request  Medication Name:   Requested Prescriptions     Pending Prescriptions Disp Refills     dextroamphetamine-amphetamine (ADDERALL) 5 mg Tab tablet 30 tablet 0     Sig: Take 1 tablet by mouth daily.     dextroamphetamine-amphetamine (ADDERALL) 30 mg Tab 30 tablet 0     Sig: Take 30 mg by mouth daily.     Date Last Fill: 8/09/2019  Pharmacy: [facility] #[id number]      Submit electronically to pharmacy  Controlled Substance Agreement Date Scanned:   Encounter-Level CSA Scan Date - 12/11/2017:    Scan on 12/15/2017  7:21 AM (below)             Encounter-Level CSA Scan Date - 09/22/2016:    Scan on 9/26/2016  1:38 PM (below)             Encounter-Level CSA Scan Date - 11/05/2015:    Scan on 11/9/2015  1:40 PM (below)         Last office visit with prescriber/PCP: 1/17/2019 Pantera Rooney MD OR same dept: 1/17/2019 Pantera Rooney MD OR same specialty: 1/17/2019 Pantera Rooney MD  Last physical: Visit date not found Last MTM visit: Visit date not found

## 2021-06-02 VITALS — BODY MASS INDEX: 22.68 KG/M2 | WEIGHT: 162 LBS | HEIGHT: 71 IN

## 2021-06-02 NOTE — TELEPHONE ENCOUNTER
Controlled Substance Refill Request  Medication Name:   Requested Prescriptions     Pending Prescriptions Disp Refills     dextroamphetamine-amphetamine (ADDERALL XR) 30 MG 24 hr capsule 30 capsule 0     Sig: Take 1 capsule (30 mg total) by mouth daily.     dextroamphetamine-amphetamine (ADDERALL) 5 mg Tab tablet 30 tablet 0     Sig: Take 1 tablet by mouth daily.     Date Last Fill: 9/9/19 and 9/6/19  [id number]      Submit electronically to pharmacy  Controlled Substance Agreement Date Scanned:   Last office visit with prescriber/PCP: 1/17/2019 Pantera Rooney MD OR same dept: Visit date not found OR same specialty: Visit date not found  Last physical: Visit date not found Last MT visit: Visit date not found

## 2021-06-03 NOTE — TELEPHONE ENCOUNTER
Prescription Monitoring Program activity reviewed with no discrepancies noted.  Last fill per : 10/30/19    Controlled Substance Agreement on file: Yes  Date: 12-11-17    Last office visit with provider:  1/17/2019 Pantera Rooney MD    Please advise.

## 2021-06-03 NOTE — TELEPHONE ENCOUNTER
Controlled Substance Refill Request  Medication Name:   Requested Prescriptions     Pending Prescriptions Disp Refills     dextroamphetamine-amphetamine (ADDERALL XR) 30 MG 24 hr capsule 30 capsule 0     Sig: Take 1 capsule (30 mg total) by mouth daily.     dextroamphetamine-amphetamine (ADDERALL) 5 mg Tab tablet 30 tablet 0     Sig: Take 1 tablet by mouth daily.     Date Last Fill: 10/30/2019  Pharmacy: [facility]      Submit electronically to pharmacy  Controlled Substance Agreement Date Scanned:   Encounter-Level CSA Scan Date - 12/11/2017:    Scan on 12/15/2017  7:21 AM           Encounter-Level CSA Scan Date - 09/22/2016:    Scan on 9/26/2016  1:38 PM           Encounter-Level CSA Scan Date - 11/05/2015:    Scan on 11/9/2015  1:40 PM       Last office visit with prescriber/PCP: 1/17/2019 Pantera Rooney MD OR same dept: 1/17/2019 Pantera Rooney MD OR same specialty: 1/17/2019 Pantera Rooney MD  Last physical: Visit date not found Last MTM visit: Visit date not found

## 2021-06-04 NOTE — TELEPHONE ENCOUNTER
Controlled Substance Refill Request  Medication Name:   Requested Prescriptions     Pending Prescriptions Disp Refills     dextroamphetamine-amphetamine (ADDERALL) 5 mg Tab tablet 30 tablet 0     Sig: Take 1 tablet by mouth daily.     dextroamphetamine-amphetamine (ADDERALL XR) 30 MG 24 hr capsule 30 capsule 0     Sig: Take 1 capsule (30 mg total) by mouth daily.     Date Last Fill: 11/25/19  [id number]      Submit electronically to pharmacy  Controlled Substance Agreement Date Scanned:   Encounter-Level CSA Scan Date - 12/11/2017:    Scan on 12/15/2017  7:21 AM           Encounter-Level CSA Scan Date - 09/22/2016:    Scan on 9/26/2016  1:38 PM           Encounter-Level CSA Scan Date - 11/05/2015:    Scan on 11/9/2015  1:40 PM       Last office visit with prescriber/PCP: 1/17/2019 Pantera Rooney MD OR same dept: Visit date not found OR same specialty: Visit date not found  Last physical: Visit date not found Last MTM visit: Visit date not found

## 2021-06-05 NOTE — TELEPHONE ENCOUNTER
Prescription Monitoring Program activity reviewed with no discrepancies noted.      Last fill per &Quantity/days supply:    12/24/2019 Dextroamp-Amphet Er 30 Mg C  30 tablets for 30 days  12/24/2019 Dextroamp-Amphetamine 5 Mg   30 tablets for 30 days        Controlled Substance Agreement on file: Yes  Date: 1/17/19    Last office visit with provider:  1/17/2019 Pantera Rooney MD    Please advise.

## 2021-06-05 NOTE — TELEPHONE ENCOUNTER
Controlled Substance Refill Request  Medication Name:   Requested Prescriptions     Pending Prescriptions Disp Refills     dextroamphetamine-amphetamine (ADDERALL XR) 30 MG 24 hr capsule 30 capsule 0     Sig: Take 1 capsule (30 mg total) by mouth daily.     dextroamphetamine-amphetamine (ADDERALL) 5 mg Tab tablet 30 tablet 0     Sig: Take 1 tablet by mouth daily.   Date Last Fill: 12/24/19  [facility]ed [facility]: [id number]  Submit electronically to pharmacy  Controlled Substance Agreement on file:   Encounter-Level CSA Scan Date - 12/11/2017:    Scan on 12/15/2017  7:21 AM           Encounter-Level CSA Scan Date - 09/22/2016:    Scan on 9/26/2016  1:38 PM           Encounter-Level CSA Scan Date - 11/05/2015:    Scan on 11/9/2015  1:40 PM        Last office visit:  01/1719

## 2021-06-05 NOTE — TELEPHONE ENCOUNTER
Dr. Rooney,  Spoke with the patient, who states that he will be taking his last pills tomorrow.  He is requesting that this is refilled tomorrow at the latest.  Patient is aware that you are out of the clinic until Wednesday.  Michelle PORTILLO CMA/MIRIAN....................1:16 PM

## 2021-06-05 NOTE — TELEPHONE ENCOUNTER
Who is calling:  Patient  Reason for Call: The writer read the following to patient per Dr Rooney: This is early.  It can be refilled later this week. Patient  argues  it is not too early and wants the medication today;  States it was filled on 12/24 and he has one pill left.  Writer stated will have provider advise as this is what the provider wrote.  Please call him.   Date of last appointment with primary care: NA  Okay to leave a detailed message: No

## 2021-06-06 NOTE — TELEPHONE ENCOUNTER
Refill Request  Did you contact pharmacy: Yes  Medication name: Adderall XR 30 mg 24 hr capsule  Requested Prescriptions      No prescriptions requested or ordered in this encounter     Who prescribed the medication: PCP  [facility]  Is patient out of medication: No.  4 days left Pt wanting to know if PCP will refill this Rx without an appointment first.   Patient notified refills processed in 3 business days:  yes  Okay to leave a detailed message: yes

## 2021-06-06 NOTE — TELEPHONE ENCOUNTER
Prescription Monitoring Program activity reviewed with no discrepancies noted.      Last fill per : 1/22/2020  Quantity/days supply: 30    Controlled Substance Agreement on file: Yes  Date: 1/17/2019    Last office visit with provider:  1/17/2019 Pantera Rooney MD    Please advise.

## 2021-06-06 NOTE — TELEPHONE ENCOUNTER
Left message to call back for: Sergio  Information to relay to patient:  Needs to  Make an appointment for a med review.  Script was sent but last refill till seen    Fernanda Dickerson CMA ([facility])

## 2021-06-06 NOTE — TELEPHONE ENCOUNTER
Controlled Substance Refill Request  Medication Name:   Requested Prescriptions     Pending Prescriptions Disp Refills     dextroamphetamine-amphetamine (ADDERALL) 30 mg Tab 30 tablet 0     Sig: Take 30 mg by mouth daily.     dextroamphetamine-amphetamine (ADDERALL) 5 mg Tab tablet 30 tablet 0     Sig: Take 1 tablet by mouth daily.     Date Last Fill: 01/22/20  [facility]  [facility] to [facility]  Controlled Substance Agreement on file:   Encounter-Level CSA Scan Date - 12/11/2017:    Scan on 12/15/2017  7:21 AM           Encounter-Level CSA Scan Date - 09/22/2016:    Scan on 9/26/2016  1:38 PM           Encounter-Level CSA Scan Date - 11/05/2015:    Scan on 11/9/2015  1:40 PM        Last office visit:

## 2021-06-06 NOTE — TELEPHONE ENCOUNTER
Prescription Monitoring Program activity reviewed with no discrepancies noted.      Last fill per : 2/20/  Quantity/days supply: 30/30    Controlled Substance Agreement on file: Yes  Date:     Last office visit with provider:  1/17/2019 Pantera Rooney MD    Please advise.  Fernanda Dickerson CMA ([facility])

## 2021-06-07 NOTE — TELEPHONE ENCOUNTER
Controlled Substance Refill Request  Medication Name:   Requested Prescriptions     Pending Prescriptions Disp Refills     dextroamphetamine-amphetamine (ADDERALL XR) 30 MG 24 hr capsule 30 capsule 0     Sig: Take 1 capsule (30 mg total) by mouth daily.     dextroamphetamine-amphetamine (ADDERALL) 5 mg Tab tablet 30 tablet 0     Sig: Take 1 tablet by mouth daily.     Date Last Fill: 1/22/2020 and 3/17/2020  [id number]  Submit electronically to pharmacy  Controlled Substance Agreement on file:   Encounter-Level CSA Scan Date - 12/11/2017:    Scan on 12/15/2017  7:21 AM           Encounter-Level CSA Scan Date - 09/22/2016:    Scan on 9/26/2016  1:38 PM           Encounter-Level CSA Scan Date - 11/05/2015:    Scan on 11/9/2015  1:40 PM        Last office visit:  1/17/19      FYI: Patient stated that he would like the Adderall extended release this time.

## 2021-06-07 NOTE — PROGRESS NOTES
Assessment/Plan:            ADHD.  I reviewed medications with patient.  He is on Adderall 30 mg XR daily and an additional 5 mg short acting at lunchtime.  He has been on this a dose for some time and it seems to be effective.  He is doing well with his job.  Continue current dosing.  He did sign a new controlled substance agreement.  I did review the  and there were no discrepancies noted.  We will do a urine drug screen today.    Mild hypertension.  Systolic pressure is up a little today but he tells me he has not been taking his lisinopril regularly.  I encouraged him to begin taking it again.    Skin rash.  Primarily in the left arm and lower back.  We will try him on some triamcinolone.    I would like to see him back in 6 months.    Subjective:    Patient ID: Sergio Cuadra is a 30 y.o. male.  He comes in today primarily to follow-up on his ADHD and having medication check.  He is a little overdue for a visit but when he called he was put off because of the viral pandemic.  Given the situation I thought it would be a good time to bring him down as he is young and healthy and presents minimal exposure risk.  For the ADHD he has been using Adderall XR 30 mg daily an additional 5 mg of the short acting Adderall at lunchtime.  We had this reviewed just over 6 months ago by our pharmacist who felt this was a reasonable approach given he has had good control.  He continues to work regularly without problem.  He tells me he has purchased a new home [place] [facility] and seems to be very calm and relaxed.  Appetite is good and he is sleeping well.    He has had a history of mild hypertension and was on lisinopril.  For some reason he has not been taking this regularly.  Blood pressure is somewhat elevated today.  No headaches or dizziness and no chest pain or shortness of breath.    He does report on having a rash on the left forearm and lower back region.  This is occurred off and on for a number of years but  it seems particularly bad this spring.  There is some itching but no open sores or drainage.    Otherwise he feels good with no other specific complaints.    Rash             Review of Systems   Skin: Positive for rash.             Objective:    Physical Exam      On examination today his blood pressure is 144/84.  Temperature is 98.5.  Weight is 164 pounds.    Lungs are clear.    Heart rhythm is stable with rate of 88 and no ectopy.    No peripheral edema.    Skin does show a fine erythematous flat rash on the left forearm and his lower back.    The patient is alert and oriented x3.  Mood and affect seem appropriate.

## 2021-06-08 ENCOUNTER — COMMUNICATION - HEALTHEAST (OUTPATIENT)
Dept: INTERNAL MEDICINE | Facility: CLINIC | Age: 31
End: 2021-06-08

## 2021-06-08 DIAGNOSIS — F90.9 ATTENTION DEFICIT HYPERACTIVITY DISORDER (ADHD), UNSPECIFIED ADHD TYPE: ICD-10-CM

## 2021-06-08 NOTE — TELEPHONE ENCOUNTER
Prescription Monitoring Program activity reviewed with no discrepancies noted.      Last fill per /Quantity/days supply  -04/16/20 Dextroamp-Amphet Er 30 Mg   30 tablets for 30 days    -04/16/2020   Dextroamp-Amphetamine 5 Mg Tab   30 tablets for 30 days  Controlled Substance Agreement on file: Yes  Date: 4/16/20    Last office visit with provider:  4/16/2020 Pantera Rooney MD    Please advise.

## 2021-06-08 NOTE — TELEPHONE ENCOUNTER
Prescription Monitoring Program activity reviewed with no discrepancies noted.      Last fill per : 5/15/20  Quantity/days supply: 30/30        Please advise.

## 2021-06-08 NOTE — TELEPHONE ENCOUNTER
Controlled Substance Refill Request  Medication Name:   Requested Prescriptions     Pending Prescriptions Disp Refills     dextroamphetamine-amphetamine (ADDERALL XR) 30 MG 24 hr capsule 30 capsule 0     Sig: Take 1 capsule (30 mg total) by mouth daily.     dextroamphetamine-amphetamine (ADDERALL) 5 mg Tab tablet 30 tablet 0     Sig: Take 1 tablet by mouth daily.     Date Last Fill: 4/16/20  Is patient out of medication?: No, 3 days left  Patient notified refills processed within 3 business days:  Yes  [facility]  [facility] to [facility]  Controlled Substance Agreement on file:   Encounter-Level CSA Scan Date - 12/11/2017:    Scan on 12/15/2017  7:21 AM           Encounter-Level CSA Scan Date - 09/22/2016:    Scan on 9/26/2016  1:38 PM           Encounter-Level CSA Scan Date - 11/05/2015:    Scan on 11/9/2015  1:40 PM        Last office visit:  4/16/20

## 2021-06-08 NOTE — TELEPHONE ENCOUNTER
Controlled Substance Refill Request  Medication Name:   Requested Prescriptions     Pending Prescriptions Disp Refills     dextroamphetamine-amphetamine (ADDERALL XR) 30 MG 24 hr capsule 30 capsule 0     Sig: Take 1 capsule (30 mg total) by mouth daily.     dextroamphetamine-amphetamine (ADDERALL) 5 mg Tab tablet 30 tablet 0     Sig: Take 1 tablet by mouth daily.     Date Last Fill: 05/15/20  [facility]  [facility] to pharmacy  Controlled Substance Agreement on file:   Encounter-Level CSA Scan Date - 12/11/2017:    Scan on 12/15/2017  7:21 AM           Encounter-Level CSA Scan Date - 09/22/2016:    Scan on 9/26/2016  1:38 PM           Encounter-Level CSA Scan Date - 11/05/2015:    Scan on 11/9/2015  1:40 PM      Please expedite refill patient is out.  Last office visit:  04/16/20

## 2021-06-09 NOTE — TELEPHONE ENCOUNTER
Prescription Monitoring Program activity reviewed with no discrepancies noted.      Last fill per : 6/16/20  Quantity/days supply: 30 tabs , 30 days     Controlled Substance Agreement on file: No  Date:     Last office visit with provider:  4/16/2020 Pantera Rooney MD    Please advise.    Cynthia Granger LPN

## 2021-06-09 NOTE — TELEPHONE ENCOUNTER
Controlled Substance Refill Request  Medication Name:   Requested Prescriptions     Pending Prescriptions Disp Refills     dextroamphetamine-amphetamine (ADDERALL) 5 mg Tab tablet 30 tablet 0     Sig: Take 1 tablet by mouth daily.     dextroamphetamine-amphetamine (ADDERALL XR) 30 MG 24 hr capsule 30 capsule 0     Sig: Take 1 capsule (30 mg total) by mouth daily.     Date Last Fill: 6/16/20  [id number]  Submit electronically to pharmacy  Controlled Substance Agreement on file:   Encounter-Level CSA Scan Date - 12/11/2017:    Scan on 12/15/2017  7:21 AM           Encounter-Level CSA Scan Date - 09/22/2016:    Scan on 9/26/2016  1:38 PM           Encounter-Level CSA Scan Date - 11/05/2015:    Scan on 11/9/2015  1:40 PM        Last office visit:  4/16/20

## 2021-06-10 NOTE — TELEPHONE ENCOUNTER
Controlled Substance Refill Request  Medication Name:   Requested Prescriptions     Pending Prescriptions Disp Refills     dextroamphetamine-amphetamine (ADDERALL XR) 30 MG 24 hr capsule 30 capsule 0     Sig: Take 1 capsule (30 mg total) by mouth daily.     dextroamphetamine-amphetamine (ADDERALL) 5 mg Tab tablet 30 tablet 0     Sig: Take 1 tablet by mouth daily.     Date Last Fill: both 7/15/2020  [facility]  [facility] to [facility]  Controlled Substance Agreement on file:   Encounter-Level CSA Scan Date - 12/11/2017:    Scan on 12/15/2017  7:21 AM           Encounter-Level CSA Scan Date - 09/22/2016:    Scan on 9/26/2016  1:38 PM           Encounter-Level CSA Scan Date - 11/05/2015:    Scan on 11/9/2015  1:40 PM        Last office visit:  4/16/2020

## 2021-06-11 NOTE — TELEPHONE ENCOUNTER
Prescription Monitoring Program activity reviewed with no discrepancies noted.      Last fill per : 8/14  Quantity/days supply: 30/30    Controlled Substance Agreement on file: Yes  Date:     Last office visit with provider:  4/16/2020 Pantera Rooney MD    Please advise.    Fernanda Dickerson CMA ([facility])

## 2021-06-11 NOTE — TELEPHONE ENCOUNTER
Controlled Substance Refill Request  Medication Name:   Requested Prescriptions     Pending Prescriptions Disp Refills     dextroamphetamine-amphetamine (ADDERALL XR) 30 MG 24 hr capsule 30 capsule 0     Sig: Take 1 capsule (30 mg total) by mouth daily.     dextroamphetamine-amphetamine (ADDERALL) 5 mg Tab tablet 30 tablet 0     Sig: Take 1 tablet by mouth daily.     Date Last Fill: 8/14/2020  Is patient out of medication?:  Yes  Patient notified refills processed within 3 business days:  Yes  [facility]  [facility] to [facility]  Controlled Substance Agreement on file:   Encounter-Level CSA Scan Date - 12/11/2017:    Scan on 12/15/2017  7:21 AM           Encounter-Level CSA Scan Date - 09/22/2016:    Scan on 9/26/2016  1:38 PM           Encounter-Level CSA Scan Date - 11/05/2015:    Scan on 11/9/2015  1:40 PM        Last office visit:  4/16/2020

## 2021-06-12 NOTE — TELEPHONE ENCOUNTER
Controlled Substance Refill Request  Medication Name:   Requested Prescriptions     Pending Prescriptions Disp Refills     dextroamphetamine-amphetamine (ADDERALL XR) 30 MG 24 hr capsule 30 capsule 0     Sig: Take 1 capsule (30 mg total) by mouth daily.     dextroamphetamine-amphetamine (ADDERALL) 5 mg Tab tablet 30 tablet 0     Sig: Take 1 tablet by mouth daily.     Date Last Fill: 9/14/2020  [facility]  [facility] to [facility]  Controlled Substance Agreement on file:   Encounter-Level CSA Scan Date - 12/11/2017:    Scan on 12/15/2017  7:21 AM           Encounter-Level CSA Scan Date - 09/22/2016:    Scan on 9/26/2016  1:38 PM           Encounter-Level CSA Scan Date - 11/05/2015:    Scan on 11/9/2015  1:40 PM        Last office visit:  4/16/2020    Patient states he will make an appointment to establish care with a new provider as soon as he can.  Please fill for a month.

## 2021-06-12 NOTE — TELEPHONE ENCOUNTER
Prescription Monitoring Program activity reviewed with no discrepancies noted.      Last fill per & Quantity/days supply:  09/14/20 Dextroamp-Amphet Er 30 Mg Cap   30 tablets for 30 days  09/14/20Dextroamp-Amphetamine 5 Mg Tab   30 tablets for 30 days  Controlled Substance Agreement on file: Yes  Date: 4/16/20    Last office visit with provider:  4/16/2020 Pantera Rooney MD    Please advise.

## 2021-06-13 NOTE — PROGRESS NOTES
"Sergio Cuadra is a 30 y.o. male who is being evaluated via a billable telephone visit.      The patient has been notified of following:     \"This telephone visit will be conducted via a call between you and your physician/provider. We have found that certain health care needs can be provided without the need for a physical exam.  This service lets us provide the care you need with a short phone conversation.  If a prescription is necessary we can send it directly to your pharmacy.  If lab work is needed we can place an order for that and you can then stop by our lab to have the test done at a later time.    Telephone visits are billed at different rates depending on your insurance coverage. During this emergency period, for some insurers they may be billed the same as an in-person visit.  Please reach out to your insurance provider with any questions.    If during the course of the call the physician/provider feels a telephone visit is not appropriate, you will not be charged for this service.\"    Patient has given verbal consent to a Telephone visit? Yes    What phone number would you like to be contacted at? [phone]    Patient would like to receive their AVS by AVS Preference: Mail a copy.    Chief Complaint   Patient presents with     [facility]     med check     Sergio is a former patient of Dr. Rooney who calls today to establish care and for refills of his medication for ADHD.  He signed a controlled substance agreement and did urine drug testing in April of this year. I checked his profile in [place] Prescription Monitoring Program yesterday and there were no reg flags.    I ask Sergio about his history. He was diagnosed in 4th grade and has been on medication ever since. He was on Ritalin as a kid, then tried Concerta, then switched to Adderall in middle school.  When   When he got older he started to take higher dose.     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 Attention deficit hyperactivity disorder (ADHD), unspecified ADHD type  Well controlled on:   - dextroamphetamine-amphetamine (ADDERALL) 5 mg Tab tablet; Take 1 tablet by mouth daily.  Dispense: 30 tablet; Refill: 0  - dextroamphetamine-amphetamine (ADDERALL XR) 30 MG 24 hr capsule; Take 1 capsule (30 mg total) by mouth daily.  Dispense: 30 capsule; Refill: 0    2. Rash and nonspecific skin eruption  - triamcinolone (KENALOG) 0.1 % cream; Apply to skin rash twice daily  Dispense: 30 g; Refill: 2    Return in about 4 months (around 4/15/2021).      Phone call duration:  10 minutes  12:35 - 12:45    Luana Ochoa MD

## 2021-06-13 NOTE — TELEPHONE ENCOUNTER
I have not yet met this patient.  He has a telephone appt tomorrow to establish care with me - will do refill then if appropriate.    NOTE - [place] Prescription Monitoring Program reviewed - no red flags

## 2021-06-13 NOTE — TELEPHONE ENCOUNTER
Controlled Substance Refill Request  Medication Name:   Requested Prescriptions     Pending Prescriptions Disp Refills     dextroamphetamine-amphetamine (ADDERALL XR) 30 MG 24 hr capsule 30 capsule 0     Sig: Take 1 capsule (30 mg total) by mouth daily.     dextroamphetamine-amphetamine (ADDERALL) 5 mg Tab tablet 30 tablet 0     Sig: Take 1 tablet by mouth daily.     Date Last Fill: 10/14/2020  [facility]  Submit electronically to pharmacy  Controlled Substance Agreement on file:   Encounter-Level CSA Scan Date - 12/11/2017:    Scan on 12/15/2017  7:21 AM           Encounter-Level CSA Scan Date - 09/22/2016:    Scan on 9/26/2016  1:38 PM           Encounter-Level CSA Scan Date - 11/05/2015:    Scan on 11/9/2015  1:40 PM        Last office visit:  4/16/2020      Patient was advised to establish care with a provider.  Patient was working and did not have time to speak with scheduling.

## 2021-06-13 NOTE — TELEPHONE ENCOUNTER
Who is calling:  Patient   Reason for Call:  Patient stated that he will be out after today and would like this to be expedite if possible.  Date of last appointment with primary care: n/a  Okay to leave a detailed message: No

## 2021-06-13 NOTE — TELEPHONE ENCOUNTER
Controlled Substance Refill Request  Medication Name:   Requested Prescriptions     Pending Prescriptions Disp Refills     dextroamphetamine-amphetamine (ADDERALL XR) 30 MG 24 hr capsule 30 capsule 0     Sig: Take 1 capsule (30 mg total) by mouth daily.     dextroamphetamine-amphetamine (ADDERALL) 5 mg Tab tablet 30 tablet 0     Sig: Take 1 tablet by mouth daily.     Date Last Fill: 11/16/2020  [facility]  Submit electronically to pharmacy  Controlled Substance Agreement on file:   Encounter-Level CSA Scan Date - 12/11/2017:    Scan on 12/15/2017  7:21 AM           Encounter-Level CSA Scan Date - 09/22/2016:    Scan on 9/26/2016  1:38 PM           Encounter-Level CSA Scan Date - 11/05/2015:    Scan on 11/9/2015  1:40 PM        Last office visit:  4/16/2020      Patient has establish care appointment on 12/15/2020 with Dr Ochoa.

## 2021-06-14 NOTE — TELEPHONE ENCOUNTER
Reason for Call:  Medication or medication refill:    Do you use a [facility]?  Name of the pharmacy and phone number for the current request[facility]    Name of the medication requested:   dextroamphetamine-amphetamine (ADDERALL XR) 30 MG 24 hr capsule 30 capsule 0 12/15/2020      dextroamphetamine-amphetamine (ADDERALL) 5 mg Tab tablet 30 tablet 0 12/15/2020 12/15/2021          Other request: na    Can we leave a detailed message on this number? Yes    Phone number patient can be reached at: Home number on file [phone] (home)    Best Time: any    Call taken on 1/15/2021 at 12:17 PM by Pamela Behr

## 2021-06-14 NOTE — TELEPHONE ENCOUNTER
Calling about Adderall XR medication.  The [facility] in  [place] where the Adderall XR was called in by Dr. Behr did not go through because the phone lines are down.  The patient will send a my chart message requesting the medication to go to a different pharmacy.  Patient given help desk phone number to patient my chart Edwin;Gerri Celis RN, [facility] Triage Nurse Advisor        Additional Information    Caller has medication question only, adult not sick, and triager answers question    Protocols used: MEDICATION QUESTION CALL-A-

## 2021-06-14 NOTE — PROGRESS NOTES
[facility] Follow Up Note    Sergio Cuadra   27 y.o. male    Date of Visit: 12/11/2017    Chief Complaint   Patient presents with     Follow-up     med check     Subjective  This is a 27-year-old man with known ADHD who is on Adderall.  He is a little overdue for his routine visit.  There has been no change in the dose of this medication and his refills have been appropriate.  He seems to be tolerating the medication and he is doing well with it.  He continues to hold a job and to work regularly.  He offers no particular complaints today.  The other issue is his blood pressure.  On his last visit he was found to have some slight elevation of his blood pressure and was started on a beta-blocker.  He did not tolerate the beta-blocker and so stopped it.  He has not been back to follow-up on this and is not taking any blood pressure medication at this time.  He denies any headaches or dizziness and has had no chest pain or shortness of breath.    ROS A comprehensive review of systems was performed and was otherwise negative    Medications, allergies, and problem list were reviewed and updated    Exam  General Appearance:   On examination his blood pressure is 162/80.  Weight is 159 pounds and height is 70.5 inches.  BMI is 22.49.    Lungs are clear.    Heart is in a sinus rhythm with a rate of 104 and no ectopy.    No peripheral edema.    The patient is alert and oriented ×3.      Assessment/Plan  1. ADHD     2. HTN (hypertension)       ADHD.  Stable.  Continue current medication.  I would like to recheck in 6 months.    Hypertension.  I did discuss this with him and will start him on some lisinopril 10 mg daily.  I told him that I would like to see him back in 1 month to recheck the blood pressure.    Total time of this office visit was 25 minutes with greater than 50% of the time spent in care coordination and patient counseling.      Pantera Rooney MD      Current Outpatient Prescriptions on  File Prior to Visit   Medication Sig     dextroamphetamine-amphetamine (ADDERALL) 30 mg Tab Take 30 mg by mouth daily.     dextroamphetamine-amphetamine (ADDERALL) 5 mg Tab tablet Take 1 tablet by mouth daily.     [DISCONTINUED] metoprolol succinate (TOPROL-XL) 50 MG 24 hr tablet Take 1 tablet (50 mg total) by mouth daily.     No current facility-administered medications on file prior to visit.      No Known Allergies  Social History   Substance Use Topics     Smoking status: Never Smoker     Smokeless tobacco: Never Used     Alcohol use None

## 2021-06-14 NOTE — TELEPHONE ENCOUNTER
Spoke with patient and informed him that his medication was refilled. Patient understood. No further questions.    BESSIE/ANGELA

## 2021-06-14 NOTE — TELEPHONE ENCOUNTER
Reason for Call:  Medication or medication refill:    Do you use a [facility]?  Name of the pharmacy and phone number for the current request: [facility] [phone]    Name of the medication requested: ADDEROL - PATIENT OUT OF MEDE    Other request: ASAP    Can we leave a detailed message on this number? Yes    Phone number patient can be reached at: Cell number on file:    Telephone Information:   Mobile [phone]       Best Time: ANYTIME - ASAP    Call taken on 1/18/2021 at 9:29 AM by Yuliet Kebede

## 2021-06-15 NOTE — TELEPHONE ENCOUNTER
"Sergio is due for a preventive care (annual exam) visit and tetanus vaccine.  Please call him to schedule - thank you    ----- Message from Epifanio Diehl sent at 2021 11:04 PM CST -----  Regarding: Cancellation of Order # [id number]  Order number [id number] for the procedure TDAP VACCINE GREATER   THAN OR EQUAL TO 6YO IM [IMM61] has been canceled by AdminEpifanio   [ADMIN]. This procedure was ordered by Luana Ochoa MD [[id number]]   on Dec 15, 2020 for the patient Sergio Cuadra [[id number]]. The   reason for cancellation was \"Order \".    This was a standing order with 1 occurrences remaining.    "

## 2021-06-15 NOTE — TELEPHONE ENCOUNTER
Reason for Call:  Medication or medication refill:    Do you use a [facility]?  Name of the pharmacy and phone number for the current request: [facility]     Name of the medication requested: ADDEROL 5MG AND 30MG    Other request: N/A    Can we leave a detailed message on this number? Yes    Phone number patient can be reached at: Cell number on file:    Telephone Information:   Mobile [phone]       Best Time: ANYTIME    Call taken on 2/15/2021 at 10:34 AM by Yuliet Kebede

## 2021-06-15 NOTE — TELEPHONE ENCOUNTER
Controlled Substance Refill Request  Medication Name:   Requested Prescriptions     Pending Prescriptions Disp Refills     dextroamphetamine-amphetamine (ADDERALL XR) 30 MG 24 hr capsule 30 capsule 0     Sig: Take 1 capsule (30 mg total) by mouth daily.     dextroamphetamine-amphetamine (ADDERALL) 5 mg Tab tablet 30 tablet 0     Sig: Take 1 tablet by mouth daily.     Date Last Fill: 1/18/21  [facility]  Submit electronically to pharmacy  Controlled Substance Agreement on file:   Encounter-Level CSA Scan Date - 12/11/2017:    Scan on 12/15/2017  7:21 AM           Encounter-Level CSA Scan Date - 09/22/2016:    Scan on 9/26/2016  1:38 PM           Encounter-Level CSA Scan Date - 11/05/2015:    Scan on 11/9/2015  1:40 PM        Last office visit:  12/15/20

## 2021-06-16 NOTE — TELEPHONE ENCOUNTER
Controlled Substance Refill Request  Medication Name:   Requested Prescriptions     Pending Prescriptions Disp Refills     dextroamphetamine-amphetamine (ADDERALL) 5 mg Tab tablet 30 tablet 0     Sig: Take 1 tablet by mouth daily.     dextroamphetamine-amphetamine (ADDERALL XR) 30 MG 24 hr capsule 30 capsule 0     Sig: Take 1 capsule (30 mg total) by mouth daily.     Date Last Fill: 2/17/21  [facility]  [facility] electronically to pharmacy  Controlled Substance Agreement on file:   Encounter-Level CSA Scan Date - 12/11/2017:    Scan on 12/15/2017  7:21 AM           Encounter-Level CSA Scan Date - 09/22/2016:    Scan on 9/26/2016  1:38 PM           Encounter-Level CSA Scan Date - 11/05/2015:    Scan on 11/9/2015  1:40 PM        Last office visit:  12/15/20

## 2021-06-16 NOTE — TELEPHONE ENCOUNTER
Telephone Encounter by Michelle Drummond CMA at 1/22/2020  9:29 AM     Author: Michelle Drummond CMA Service: -- Author Type: Medical Assistant    Filed: 1/22/2020  9:30 AM Encounter Date: 1/20/2020 Status: Signed    : Michelle Drummond CMA (Medical Assistant)       Prescriptions have been set up for Dr. Rooney to review per the following message:  Pantera Rooney MD  You 52 minutes ago (8:37 AM)      Okay to refill     Left message for the patient letting him know that the prescription is being sent to his local pharmacy.  Asked that he call with any further questions.  Michelle PORTILLO CMA/MIRIAN....................9:30 AM

## 2021-06-16 NOTE — TELEPHONE ENCOUNTER
Reason for Call:  Medication or medication refill:    Do you use a [facility]?  Name of the pharmacy and phone number for the current request:   [facility] [facility]    Name of the medication requested:   Adderall 5mg  Adderall 30mg    Other request: Pt concerned about getting his medication on time this month    Can we leave a detailed message on this number? Yes    Phone number patient can be reached at: Cell number on file:    Telephone Information:   Mobile [phone]       Best Time: anytime    Call taken on 4/15/2021 at 10:18 AM by Yuliet Kebede

## 2021-06-16 NOTE — TELEPHONE ENCOUNTER
Telephone Encounter by Swapnil Livingston, Sky at 9/9/2019 12:40 PM     Author: Swapnil Livingston, Sky Service: -- Author Type: Pharmacist    Filed: 9/9/2019 12:46 PM Encounter Date: 9/9/2019 Status: Signed    : Swapnil Livingston PharmD (Pharmacist)         Refills appear appropriate based on [place] prescription monitoring program.  He has had a urine drug screen and controlled substance agreement in the last year.  Per [facility] policy he should be seen in clinic every 6 months for his Adderall refills.    There is a different kinetic profile between the immediate release and extended release forms of Adderall, I do not see any specific concerns about switching this for his 30 mg dose, however does he use the smaller 5 mg immediate release dose in the afternoon?  He may benefit from XR formulation of the 30 mg Adderall and remain on the 5 mg immediate release if he takes this in the afternoon.  On the extended release formulation he also may not require immediate release 5 mg tab to prevent afternoon crashing.  I would recommend further evaluation of this and follow-up with PCP.

## 2021-06-16 NOTE — TELEPHONE ENCOUNTER
Reason for Call:  Medication or medication refill:    Do you use a [facility]?  Name of the pharmacy and phone number for the current request: [facility]  [place] [address] [facility] [address]    Name of the medication requested:   dextroamphetamine-amphetamine (ADDERALL XR) 30 MG 24 hr capsule 30 capsule 0 2/17/2021  No   Sig - Route: Take 1 capsule (30 mg total) by mouth daily. - Oral     dextroamphetamine-amphetamine (ADDERALL) 5 mg Tab tablet 30 tablet 0 2/17/2021 2/17/2022 No   Sig - Route: Take 1 tablet by mouth daily. - Oral         Other request: na    Can we leave a detailed message on this number? Yes    Phone number patient can be reached at: Home number on file [phone] (home)    Best Time: any    Call taken on 3/16/2021 at 12:13 PM by Pamela Behr

## 2021-06-16 NOTE — TELEPHONE ENCOUNTER
Telephone Encounter by Therese Villar LPN at 2/20/2020  3:24 PM     Author: Therese Villar LPN Service: -- Author Type: Licensed Nurse    Filed: 2/20/2020  3:25 PM Encounter Date: 2/20/2020 Status: Signed    : Therese Villar LPN (Licensed Nurse)       Patient Returning Call  Reason for call:  Message from clinic  Information relayed to patient:Fernanda Dickerson CMA           2/20/20 2:05 PM   Note      Left message to call back for: Sergio  Information to relay to patient:  Needs to  Make an appointment for a med review.  Script was sent but last refill till seen     Fernanda Dickerson CMA ([facility])          Patient has additional questions:  No  If YES, what are your questions/concerns:  n/a  Okay to leave a detailed message?: No call back needed

## 2021-06-16 NOTE — TELEPHONE ENCOUNTER
Refill done - please call to schedule him for an annual exam/labs in mid April (he will need this before further refills as well) - thank you

## 2021-06-17 NOTE — PATIENT INSTRUCTIONS - HE
"Our goal is to discuss this at least every five years with all patients starting at age 18.  Advanced directives are a document that lets us know who talks for you and what your desires are in a medical situation where you are unable to talk for yourself    Here is a useful web site that includes a link to the honoring choices form (under how do I document my choices?):     https://www.[employer].org/our-community-commitment/honoring-choices    The three must haves for this form are  1) who speaks for you (health care agent if you cannot speak for yourself, and what 'schmidt' they have  2) what interventions you want if yo are permanently unconscious  3) making this legal - either by notary, or by two signatures of witnesses, neither of who is your health care agent    There are other parts to the form that are optional    No rush for this! Just something we are supposed to discuss every so often.    If you complete the form, and can make a pdf of it, you can send me a message with that pdf attached and it will become part of your chart here; if you cannot make an electronic copy to send by [email or web address], mailing the document is also fine.  You might also consider putting it on your fridge under \"Emergency Instructions.,\" as well as having a copy for yourself and your family.    Please let me know if you have any questions.    Luana Ochoa MD    "

## 2021-06-17 NOTE — TELEPHONE ENCOUNTER
Reason for Call:  Medication or medication refill:   Disp Refills Start End SUNNY   dextroamphetamine-amphetamine (ADDERALL XR) 30 MG 24 hr capsule 30 capsule         dextroamphetamine-amphetamine (ADDERALL) 5 mg Tab tablet 30 tablet           Do you use a [facility]?  Name of the pharmacy and phone number for the current request: [facility]    Name of the medication requested: see above    Other request:    Can we leave a detailed message on this number? Yes    Phone number patient can be reached at: Home number on file [phone] (home)    Best Time: any    Call taken on 5/17/2021 at 12:13 PM by Pooja Chandler

## 2021-06-17 NOTE — PROGRESS NOTES
Assessment and Plan    1. Attention deficit hyperactivity disorder (ADHD), unspecified ADHD type  Next refill OK slightly early as he is going out of town (honeymoon!) - end date adjusted to reflect when he is actually due for following refill  - dextroamphetamine-amphetamine (ADDERALL XR) 30 MG 24 hr capsule; Take 1 capsule (30 mg total) by mouth daily.  Dispense: 30 capsule; Refill: 0  - dextroamphetamine-amphetamine (ADDERALL) 5 mg Tab tablet; Take 1 tablet by mouth daily. Take along with 30 mg tab to equal 35 mg daily  Dispense: 30 tablet; Refill: 0    2. Controlled substance agreement signed  For above medications  - Drug Abuse 1+, Urine    3. Benign essential hypertension  New diagnosis - trial of adding (after wedding)  - amLODIPine (NORVASC) 10 MG tablet; Take 1 tablet (10 mg total) by mouth daily.  Dispense: 30 tablet; Refill: 1    4. Need for hepatitis C screening test  Per guidelines for all adults  - Hepatitis C Antibody (Anti-HCV); Standing    5. Screening for HIV (human immunodeficiency virus)  Per guidelines for all adults  - HIV Antigen/Antibody Screening [place]; Standing      Patient Instructions   Our goal is to discuss this at least every five years with all patients starting at age 18.  Advanced directives are a document that lets us know who talks for you and what your desires are in a medical situation where you are unable to talk for yourself    Here is a useful web site that includes a link to the honoring choices form (under how do I document my choices?):     https://www.[facility].org/our-community-commitment/honoring-choices    The three must haves for this form are  1) who speaks for you (health care agent if you cannot speak for yourself, and what 'schmidt' they have  2) what interventions you want if yo are permanently unconscious  3) making this legal - either by notary, or by two signatures of witnesses, neither of who is your health care agent    There are other parts to the form that  "are optional    No rush for this! Just something we are supposed to discuss every so often.    If you complete the form, and can make a pdf of it, you can send me a message with that pdf attached and it will become part of your chart here; if you cannot make an electronic copy to send by [employer], mailing the document is also fine.  You might also consider putting it on your fridge under \"Emergency Instructions.,\" as well as having a copy for yourself and your family.    Please let me know if you have any questions.    Luana Ochoa MD        Return in about 4 weeks (around 6/17/2021) for follow up blood pressure, and do labs.    Luana Ochoa MD     -------------------------------------------    Chief Complaint   Patient presents with     Forms Update     CSA     Medication Management     franklin Hill is a former patient of Dr. Rooney who takes Adderall for ADHD. He was diagnosed in 4th grade and has been on medication ever since. He was on Ritalin as a kid, then tried Concerta, then switched to Adderall in middle school.  When he got older he started to take higher dose.      About a year ago he switched to ER  Adderall 30 mg  in the am, then at lunch he takes regular release Adderall 5 mg    He works with iron in construction  - walks on high on a beam: Needs focus!    ROS   - no change in appetite   - no palpitations   - tends to gain weight in winter and take it off in the summer    Wt Readings from Last 3 Encounters:   05/20/21 173 lb 1.6 oz (78.5 kg)   04/16/20 164 lb 14.4 oz (74.8 kg)   01/17/19 162 lb (73.5 kg)          - ELEVATED BLOOD PRESSURE    BP Readings from Last 3 Encounters:   05/20/21 150/88   04/16/20 144/84   01/17/19 122/64     Sergio tried  lisinopril - made him feel too tired. He is getting  in 8 days, then going on honeymoon. Discussed putting off new medication until after this.     [place] th [place]    I noted mild thoracic scoliosis on exam - no symptoms. " He does have unrelated back pain treated by chiropractor and by [place] [facility] - better after PT       Current Outpatient Medications on File Prior to Visit   Medication Sig Dispense Refill     triamcinolone (KENALOG) 0.1 % cream Apply to skin rash twice daily 30 g 2     lisinopril (PRINIVIL,ZESTRIL) 10 MG tablet Take 1 tablet (10 mg total) by mouth daily. 90 tablet 3     No current facility-administered medications on file prior to visit.          Health Maintenance Due   Topic Date Due     HEPATITIS C SCREENING  Never done     PREVENTIVE CARE VISIT  Never done     HIV SCREENING  Never done     ADVANCE CARE PLANNING  11/05/2020     Health Maintenance reviewed - .    The history section was last reviewed by Srini Aponte CMA on May 20, 2021.    Social History     Tobacco Use   Smoking Status Never Smoker   Smokeless Tobacco Never Used       Social History     Substance and Sexual Activity   Alcohol Use None         Vitals:    05/20/21 1336   BP: 150/88   Pulse: 93   Temp: 98.2  F (36.8  C)   SpO2: 98%     Body mass index is 24.49 kg/m .     EXAM:    General appearance - alert, well appearing, and in no distress  Mental status - normal mood, behavior, speech, dress, motor activity, and thought processes  Eyes - funduscopic exam normal, discs flat and sharp  Mouth - mucous membranes moist, pharynx normal without lesions  Neck - supple, no significant adenopathy, carotids upstroke normal bilaterally, no bruits  Chest - clear to auscultation, no wheezes, rales or rhonchi, symmetric air entry  Heart - normal rate, regular rhythm, normal S1, S2, no murmurs, rubs, clicks or gallops

## 2021-06-17 NOTE — TELEPHONE ENCOUNTER
Telephone Encounter by Therese Villar LPN at 1/18/2021 11:00 AM     Author: Therese Villar LPN Service: -- Author Type: Licensed Nurse    Filed: 1/18/2021 11:06 AM Encounter Date: 1/18/2021 Status: Signed    : Therese Villar LPN (Licensed Nurse)       Patient requested refill on 1/15/21 to his pharmacy but their computer was not working correctly.  He is now out of medications and hopes to get it as soon as possible.  He needs it to go to work this morning.        Medication: Adderall XR 30 and Adderall 5 mg  Last Date Filled 12/15/20   pulled: YES            Only PCP Prescribing? : YES  Taken as prescribed from physician notes? YES    CSA in last year: YES  Random Utox in last year: YES    Opioids + benzodiazepines? NO    Is patient on the Executive Review Team? No.      All responses suggest: Refilling prescription

## 2021-06-17 NOTE — TELEPHONE ENCOUNTER
Pt called back. Scheduled med check with Dr Ochoa on 5/20. Pt is currently out of prescription, hoping to get a refill enough to last him until his appt date.

## 2021-06-17 NOTE — TELEPHONE ENCOUNTER
Telephone Encounter by Therese Villar LPN at 5/18/2021  8:09 AM     Author: Therese Villar LPN Service: -- Author Type: Licensed Nurse    Filed: 5/18/2021  8:12 AM Encounter Date: 5/17/2021 Status: Signed    : Therese Villar LPN (Licensed Nurse)       Patient has a medication check up with Dr. Ochoa on 5/20/21.          Medication: Adderall 5 mg  Last Date Filled 4/17/21    Medication: Adderall XR 30 mg  Last Date Filled 4/17/21     pulled: YES        Only PCP Prescribing? : YES  Taken as prescribed from physician notes? YES    CSA in last year: NO  Random Utox in last year: NO  Opioids + benzodiazepines? NO    Is patient on the Executive Review Team? NO      All responses suggest: Refilling prescription

## 2021-06-17 NOTE — TELEPHONE ENCOUNTER
Controlled Substance Refill Request  Medication Name:   Requested Prescriptions     Pending Prescriptions Disp Refills     dextroamphetamine-amphetamine (ADDERALL XR) 30 MG 24 hr capsule 30 capsule 0     Sig: Take 1 capsule (30 mg total) by mouth daily.     dextroamphetamine-amphetamine (ADDERALL) 5 mg Tab tablet 30 tablet 0     Sig: Take 1 tablet by mouth daily.     Date Last Fill: 4/17/21  [facility]  Submit electronically to pharmacy  Controlled Substance Agreement on file:   Encounter-Level CSA Scan Date - 12/11/2017:    Scan on 12/15/2017  7:21 AM           Encounter-Level CSA Scan Date - 09/22/2016:    Scan on 9/26/2016  1:38 PM           Encounter-Level CSA Scan Date - 11/05/2015:    Scan on 11/9/2015  1:40 PM        Last office visit:  12/15/20      jeff

## 2021-06-17 NOTE — TELEPHONE ENCOUNTER
Telephone Encounter by Luana Walsh CMA at 12/14/2020  4:20 PM     Author: Luana Walsh CMA Service: -- Author Type: Certified Medical Assistant    Filed: 12/14/2020  4:21 PM Encounter Date: 12/14/2020 Status: Signed    : Luana Walsh CMA (Certified Medical Assistant)       Medication: Adderall 30 & 5  Last Date Filled 11/16/2020 & 11/16/2020   pulled: YES    Only PCP Prescribing? : YES  Taken as prescribed from physician notes? YES    CSA in last year: YES  Random Utox in last year: Not needed  (if any of the above answer NO - schedule with PCP)     Opioids + benzodiazepines? NO  (if the above answer YES - schedule with PCP every 6 months)     Is patient on the Executive Review Team? NO      All responses suggest: Scheduled with Dr Ochoa 12/15/2020

## 2021-06-17 NOTE — TELEPHONE ENCOUNTER
Telephone Encounter by Therese Villar LPN at 3/17/2021  9:57 AM     Author: Therese Villar LPN Service: -- Author Type: Licensed Nurse    Filed: 3/17/2021 10:06 AM Encounter Date: 3/16/2021 Status: Signed    : Therese Villar LPN (Licensed Nurse)       Medication:   Adderall XR 30 mg and Adderall 5 mg  Last Date Filled:   Both filled 2/17/21   pulled: YES        Only PCP Prescribing? : YES  Taken as prescribed from physician notes? YES    CSA in last year: YES  Random Utox in last year: YES  Opioids + benzodiazepines? YES     Last office visit with Dr. Ochoa was 12/15/20    Is patient on the Executive Review Team? NO      All responses suggest: Refilling prescription

## 2021-06-18 NOTE — LETTER
Letter by Pantera Rooney MD at      Author: Pantera Rooney MD Service: -- Author Type: --    Filed:  Encounter Date: 1/17/2019 Status: (Other)         [facility] INTERNAL MEDICINE  01/17/19    Patient: Sergio Cuadra  YOB: 1990  Medical Record Number: [id number]  CSN: [id number]                                                                              Non-opioid Controlled Substance Agreement    I understand that my care provider has prescribed a controlled substance to help manage my condition(s). I am taking this medicine to help me function or work. I know this is strong medicine, and that it can cause serious side effects. Controlled substances can be sedating, addicting and may cause a dependency on the drug. They can affect my ability to drive or think, and cause depression. They need to be taken exactly as prescribed. Combining controlled substances with certain medicines or chemicals (such as cocaine, sedatives and tranquilizers, sleeping pills, meth) can be dangerous or even fatal. Also, if I stop controlled substances suddenly, I may have severe withdrawal symptoms.  If not helpful, I may be asked to stop them.    The risks, benefits, and side effects of these medicine(s) were explained to me. I agree that:    1. I will take part in other treatments as advised by my care team. This may be psychiatry or counseling, physical therapy, behavioral therapy, group treatment or a referral to a pain clinic. I will reduce or stop my medicine when my care team tells me to do so.  2. I will take my medicines as prescribed. I will not change the dose or schedule unless my care team tells me to. There will be no refills if I run out early.  I may be contactedwithout warning and asked to complete a urine drug test or pill count at any time.   3. I will keep all my appointments, and understand this is part of the monitoring of controlled substances. My care team may require an office visit  for EVERY controlled substance refill. If I miss appointments or dont follow instructions, my care team may stop my medicine.  4. I will not ask other providers to prescribe controlled substances, and I will not accept controlled substances from other people. If I need another prescribed controlled substance for a new reason, I will tell my care team within 1 business day.  5. I will use one pharmacy to fill all of my controlled substance prescriptions, and it is up to me to make sure that I do not run out of my medicines on weekends or holidays. If my care team is willing to refill my controlled substance prescription without a visit, I must request refills only during office hours, refills may take up to 3 days to process, and it may take up to 5 to 7 days for my medicine to be mailed and ready at my pharmacy. Prescriptions will not be mailed anywhere except my pharmacy.    6. I am responsible for my prescriptions. If the medicine/prescription is lost or stolen, it will not be replaced. I also agree not to share controlled substance medicines with anyone.          [facility] INTERNAL MEDICINE  01/17/19  Patient:  Sergio Cuadra  YOB: 1990  Medical Record Number: [id number]  CSN: [id number]    7. I agree to not use ANY illegal or recreational drugs. This includes marijuana, cocaine, bath salts or other drugs. I agree not to use alcohol unless my care team says I may. I agree to give urine samples whenever asked. If I dont give a urine sample, the care team may stop my medicine.    8. If I enroll in the [place] Medical Marijuana program, I will tell my care team. I will also sign an agreement to share my medical records with my care team.    9. I will bring in my list of medicines (or my medicine bottles) each time I come to the clinic.   10. I will tell my care team right away if I become pregnant or have a new medical problem treated outside of my regular clinic.  11. I understand that this  medicine can affect my thinking and judgment. It may be unsafe for me to drive, use machinery and do dangerous tasks. I will not do any of these things until I know how the medicine affects me. If my dose changes, I will wait to see how it affects me. I will contact my care team if I have concerns about medicine side effects.    I understand that if I do not follow any of the conditions above, my prescriptions or treatment may be stopped.      I agree that my provider, clinic care team, and pharmacy may work with any city, state or federal law enforcement agency that investigates the misuse, sale, or other diversion of my controlled medicine. I will allow my provider to discuss my care with or share a copy of this agreement with any other treating provider, pharmacy or emergency room where I receive care. I agree to give up (waive) any right of privacy or confidentiality with respect to these consents.   I have read this agreement and have asked questions about anything I did not understand.    ___________________________________________________________________________  Patient signature - Date/Time  -Sergio Cuadra                                      ___________________________________________________________________________  Witness signature                                                                    ___________________________________________________________________________  Provider signature- Pantera Rooney MD

## 2021-06-20 ENCOUNTER — HEALTH MAINTENANCE LETTER (OUTPATIENT)
Age: 31
End: 2021-06-20

## 2021-06-20 NOTE — LETTER
Letter by Pantera Rooney MD at      Author: Pantera Rooney MD Service: -- Author Type: --    Filed:  Encounter Date: 4/16/2020 Status: (Other)         [facility] INTERNAL MEDICINE  04/16/20    Patient: Sergio Cuadra  YOB: 1990  Medical Record Number: [id number]  CSN: [id number]                                                                              Non-opioid Controlled Substance Agreement    I understand that my care provider has prescribed a controlled substance to help manage my condition(s). I am taking this medicine to help me function or work. I know this is strong medicine, and that it can cause serious side effects. Controlled substances can be sedating, addicting and may cause a dependency on the drug. They can affect my ability to drive or think, and cause depression. They need to be taken exactly as prescribed. Combining controlled substances with certain medicines or chemicals (such as cocaine, sedatives and tranquilizers, sleeping pills, meth) can be dangerous or even fatal. Also, if I stop controlled substances suddenly, I may have severe withdrawal symptoms.  If not helpful, I may be asked to stop them.    The risks, benefits, and side effects of these medicine(s) were explained to me. I agree that:    1. I will take part in other treatments as advised by my care team. This may be psychiatry or counseling, physical therapy, behavioral therapy, group treatment or a referral to a pain clinic. I will reduce or stop my medicine when my care team tells me to do so.  2. I will take my medicines as prescribed. I will not change the dose or schedule unless my care team tells me to. There will be no refills if I run out early.  I may be contactedwithout warning and asked to complete a urine drug test or pill count at any time.   3. I will keep all my appointments, and understand this is part of the monitoring of controlled substances. My care team may require an office visit for EVERY controlled  substance refill. If I miss appointments or dont follow instructions, my care team may stop my medicine.  4. I will not ask other providers to prescribe controlled substances, and I will not accept controlled substances from other people. If I need another prescribed controlled substance for a new reason, I will tell my care team within 1 business day.  5. I will use one pharmacy to fill all of my controlled substance prescriptions, and it is up to me to make sure that I do not run out of my medicines on weekends or holidays. If my care team is willing to refill my controlled substance prescription without a visit, I must request refills only during office hours, refills may take up to 3 days to process, and it may take up to 5 to 7 days for my medicine to be mailed and ready at my pharmacy. Prescriptions will not be mailed anywhere except my pharmacy.    6. I am responsible for my prescriptions. If the medicine/prescription is lost or stolen, it will not be replaced. I also agree not to share controlled substance medicines with anyone.          [facility] INTERNAL MEDICINE  04/16/20  Patient:  Sergio Cuadra  YOB: 1990  Medical Record Number: [id number]  CSN: [id number]    7. I agree to not use ANY illegal or recreational drugs. This includes marijuana, cocaine, bath salts or other drugs. I agree not to use alcohol unless my care team says I may. I agree to give urine samples whenever asked. If I dont give a urine sample, the care team may stop my medicine.    8. If I enroll in the [place] Medical Marijuana program, I will tell my care team. I will also sign an agreement to share my medical records with my care team.    9. I will bring in my list of medicines (or my medicine bottles) each time I come to the clinic.   10. I will tell my care team right away if I become pregnant or have a new medical problem treated outside of my regular clinic.  11. I understand that this medicine can affect my thinking and  judgment. It may be unsafe for me to drive, use machinery and do dangerous tasks. I will not do any of these things until I know how the medicine affects me. If my dose changes, I will wait to see how it affects me. I will contact my care team if I have concerns about medicine side effects.    I understand that if I do not follow any of the conditions above, my prescriptions or treatment may be stopped.      I agree that my provider, clinic care team, and pharmacy may work with any city, state or federal law enforcement agency that investigates the misuse, sale, or other diversion of my controlled medicine. I will allow my provider to discuss my care with or share a copy of this agreement with any other treating provider, pharmacy or emergency room where I receive care. I agree to give up (waive) any right of privacy or confidentiality with respect to these consents.   I have read this agreement and have asked questions about anything I did not understand.    ___________________________________________________________________________  Patient signature - Date/Time  -eSrgio Cuadra                                      ___________________________________________________________________________  Witness signature                                                                    ___________________________________________________________________________  Provider signature- Pantera Rooney MD

## 2021-06-21 NOTE — LETTER
Letter by Luana Ochoa MD at      Author: Luana Ochoa MD Service: -- Author Type: --    Filed:  Encounter Date: 5/20/2021 Status: (Other)       Non-Opioid Controlled Substance Agreement  Diagnosis: ADHD  Medication: Adderall XR 30, Adderall 5 mg IR   Quantity per month: 30/30  Number of refills before follow up visit: 6 months - may be e visit    This is an agreement between you and your provider regarding safe and appropriate controlled substance prescribing.? Controlled substances are?medicines that can cause physical and mental dependence. The manufacturing, possession and use of these medicines are regulated by law.  We here at [facility] are making a commitment to work with you in your efforts to get better.? To support you in this work, we will help you schedule regular appointments for medicine refills. If we must cancel or change your appointment for any reason, we will make sure you have enough medication to last until your next appointment.      As a Provider, I will:     Listen carefully to your concerns while treating you with dignity.     Recommend a treatment plan that I believe is in your best interest and may involve therapies other than medication.      Review the chance of benefit and the chance of harm of this medicine with you regularly and evaluate the safety and effectiveness of this therapy.       Provide a plan on how to discontinue if the decision is made to stop this medicine.       As a Patient, I understand controlled substances:       Are prescribed by my care provider to help me function or work and manage my condition(s).?    Are strong medicines and can cause serious side effects.      Need to be taken exactly as prescribed.?Combining controlled substances with certain medicines or chemicals (such as illegal drugs, alcohol, sedatives, sleeping pills, and benzodiazepines) can be dangerous or even fatal.? If I stop taking my medicines suddenly, I may have severe withdrawal  symptoms.     The risks, benefits, and side effects of these medicine(s) were explained to me. I agree that:    1. I will take part in other treatments as advised by my care team. This may be psychiatry or counseling, physical therapy, behavioral therapy, group treatment or a referral to specialist.    2. I will keep all my appointments and understand this is part of the monitoring of controlled substance.?My care team may require an office visit for EVERY controlled substance refill. If I miss appointments or dont follow instructions, my care team may stop my medicine    3. I will take my medicines as prescribed. I will not change the dose or schedule unless my care team tells me to. There will be no refills if I run out early.      4. I may be contactedwithout warning and asked to complete a urine drug test or pill count at any time. If I dont give a urine sample or participate in a pill count, the care team may stop my medicine.    5. I will only receive controlled substance prescriptions from this clinic. If treated by another provider, I will let them know I am taking controlled substances and that I have a treatment agreement with this provider. I will inform this care team within one business day if I am given a prescription for any controlled substance by another healthcare provider. This care team may contact other providers and pharmacists about my use of the medicines.     6. It is up to me to make sure that I do not run out of my medicines on weekends or holidays.?If my care team is willing to refill my prescription without a visit, I must request refills only during office hours. Refills may take up to 3 business days to process.  I will use one pharmacy to fill all my controlled substance prescriptions.  I will notify the clinic if any changes are made due to insurance changes or medication availability.    7. I am responsible for my prescriptions. If the medicine/prescription is lost, stolen or  destroyed, it will not be replaced.?I also agree not to share controlled substance medicines with anyone.     8. I am aware I should not use any illegal or recreational drugs. I agree not to drink alcohol unless my care team says I can.     9. If I enroll in the [place] Medical Cannabis program, I will tell my care team.?    10. I will tell my care team right away if I become pregnant, have a new medical problem treated outside of my regular clinic, or have a change in my medications.     11. I understand that this medicine can affect my thinking, judgment and reaction time.? Alcohol and drugs affect the brain and body, which can affect the safety of a persons driving. Being under the influence of alcohol or drugs can affect a persons decision-making, behaviors, personal safety, and the safety of others. Driving while impaired (DWI) can occur if a person is driving, operating, or in physical control of a car, motorcycle, boat, snowmobile, ATV, motorbike, off-road vehicle, or any other motor vehicle ([place] Statute 169A.20). I understand the risk if I choose to drive or operate machinery  I understand that if I do not follow any of the conditions above, my prescriptions or treatment may be stopped or changed.     I agree that my provider, clinic care team, and pharmacy may work with any city, state or federal law enforcement agency that investigates the misuse, sale, or other diversion of my controlled medicine. I will allow my provider to discuss my care with or share a copy of this agreement with any other treating provider, pharmacy or emergency room where I receive care.?    I have read this agreement and have asked questions about anything I did not understand.    ________________________________________________________  Patient Signature - Sergio Cuadra     ___________________                   Date     ________________________________________________________  Provider Signature - Luana Ochoa MD        ___________________                   Date     ________________________________________________________  Witness Signature (required if provider not present while patient signing)          ___________________                   Date

## 2021-06-23 NOTE — TELEPHONE ENCOUNTER
Controlled Substance Refill Request  Medication Name:   Requested Prescriptions     Pending Prescriptions Disp Refills     dextroamphetamine-amphetamine (ADDERALL) 30 mg Tab 30 tablet 0     Sig: Take 30 mg by mouth daily.     dextroamphetamine-amphetamine (ADDERALL) 5 mg Tab tablet 30 tablet 0     Sig: Take 1 tablet by mouth daily.     Date Last Fill: 12/26/2018  Pharmacy: [facility]      Submit electronically to pharmacy  Controlled Substance Agreement Date Scanned:   Encounter-Level CSA Scan Date - 12/11/2017:    Scan on 12/15/2017  7:21 AM (below)             Encounter-Level CSA Scan Date - 09/22/2016:    Scan on 9/26/2016  1:38 PM (below)             Encounter-Level CSA Scan Date - 11/05/2015:    Scan on 11/9/2015  1:40 PM (below)         Last office visit with prescriber/PCP: 1/17/2019 Pantera Rooney MD OR same dept: 1/17/2019 Pantera Rooney MD OR same specialty: 1/17/2019 Pantera Rooney MD  Last physical: Visit date not found Last MTM visit: Visit date not found

## 2021-06-23 NOTE — PROGRESS NOTES
[facility] Follow Up Note    Sergio Cuadra   29 y.o. male    Date of Visit: 1/17/2019    Chief Complaint   Patient presents with     Medication Problem     Vannessa Mckeon  This is a 29-year-old man with long-standing diagnosis of ADHD.  He has been on the same dose of Adderall for some time and has done well with it.  Somewhat recently he changed jobs which he is enjoying but has added a little bit of stress.  At this point he is having some questions about the effectiveness of his current medication.  He tells me that occasionally he takes a little more than he should.  However, in checking he has not asked for any early refills.  In general he is getting along okay but again wonders about adjusting his medication.  He has had some elevated blood pressure in the past but is currently not on any medication.  No headaches or dizziness and no chest pain or shortness of breath.  He reports no other new medical issues or concerns.  He reports no other changes in his medical status since I last saw him.    ROS A comprehensive review of systems was performed and was otherwise negative    Medications, allergies, and problem list were reviewed and updated    Exam  General Appearance:   On examination his blood pressure is 122/64.  Weight is 162 pounds and height is 70.5 inches.  BMI is 22.92.    Lungs are clear.    Heart is in a sinus rhythm with a rate of 82 and no ectopy.    No peripheral edema.    The patient is alert and oriented x3.  Mood and affect seem appropriate.      Assessment/Plan  1. Attention deficit hyperactivity disorder (ADHD), unspecified ADHD type  Ambulatory referral to Psychiatry   2. Elevated blood pressure reading without diagnosis of hypertension     3. Medication management  Pain Clinic Survey, Urine     ADHD.  I did discuss the situation with him.  We will have him sign a new controlled substance agreement.  He has also agreed to do a drug screen urine today.  I also  reviewed the  report which shows nothing unexpected.  I discussed with him the fact that this is not my area of expertise and if his current medication is not working as well and he needs adjustment either in medication or dosage I would prefer to have it done by someone with more expertise in the field.  We will refer him to psychiatry for medication management.    History of elevated blood pressure.  Blood pressure is good today with no medication.  We will continue to follow was needed.    Total time of this office visit was 25 minutes with greater than 50% of the time spent in care coordination and patient counseling.      Pantera Rooney MD      Current Outpatient Medications on File Prior to Visit   Medication Sig     dextroamphetamine-amphetamine (ADDERALL) 30 mg Tab Take 30 mg by mouth daily.     dextroamphetamine-amphetamine (ADDERALL) 5 mg Tab tablet Take 1 tablet by mouth daily.     lisinopril (PRINIVIL,ZESTRIL) 10 MG tablet Take 1 tablet (10 mg total) by mouth daily.     No current facility-administered medications on file prior to visit.      No Known Allergies  Social History     Tobacco Use     Smoking status: Never Smoker     Smokeless tobacco: Never Used   Substance Use Topics     Alcohol use: Not on file     Drug use: Not on file

## 2021-06-24 NOTE — TELEPHONE ENCOUNTER
Prescription Monitoring Program activity reviewed with no discrepancies noted.  Last fill per : 1/29/19    Controlled Substance Agreement on file: Yes  Date: 1/17/19    Last office visit with provider:  1/17/2019 Pantera Rooney MD    Please advise.  Fernanda Dickerson CMA ([facility])

## 2021-06-25 NOTE — TELEPHONE ENCOUNTER
Prescription Monitoring Program activity reviewed with no discrepancies noted.      Last fill per : 2/20  Quantity/days supply: 30/30    Controlled Substance Agreement on file: Yes  Date: 1/172019       Last office visit with provider:  1/17/2019 Pantera Rooney MD    Please advise.  Put in with date of 3/22    Fernanda Dickerson CMA ([facility])

## 2021-06-25 NOTE — TELEPHONE ENCOUNTER
Controlled Substance Refill Request  Medication Name:   Requested Prescriptions     Pending Prescriptions Disp Refills     dextroamphetamine-amphetamine (ADDERALL) 30 mg Tab 30 tablet 0     Sig: Take 30 mg by mouth daily.     dextroamphetamine-amphetamine (ADDERALL) 5 mg Tab tablet 30 tablet 0     Sig: Take 1 tablet by mouth daily.     Date Last Fill: 2/20/19  Pharmacy: [facility]       Submit electronically to pharmacy  Controlled Substance Agreement Date Scanned:   Encounter-Level CSA Scan Date - 12/11/2017:    Scan on 12/15/2017  7:21 AM (below)             Encounter-Level CSA Scan Date - 09/22/2016:    Scan on 9/26/2016  1:38 PM (below)             Encounter-Level CSA Scan Date - 11/05/2015:    Scan on 11/9/2015  1:40 PM (below)         Last office visit with prescriber/PCP: 1/17/2019 Pantera Rooney MD OR same dept: 1/17/2019 Pantera Rooney MD OR same specialty: 1/17/2019 Pantera Rooney MD  Last physical: Visit date not found Last [facility] visit: Visit date not found      Controlled Substance Refill Request  Medication:   Requested Prescriptions     Pending Prescriptions Disp Refills     dextroamphetamine-amphetamine (ADDERALL) 30 mg Tab 30 tablet 0     Sig: Take 30 mg by mouth daily.     dextroamphetamine-amphetamine (ADDERALL) 5 mg Tab tablet 30 tablet 0     Sig: Take 1 tablet by mouth daily.     Date Last Fill: 2/20/19  Pharmacy: [facility]    Submit electronically to pharmacy  Controlled Substance Agreement on File:   Encounter-Level CSA Scan Date - 12/11/2017:    Scan on 12/15/2017  7:21 AM (below)             Encounter-Level CSA Scan Date - 09/22/2016:    Scan on 9/26/2016  1:38 PM (below)             Encounter-Level CSA Scan Date - 11/05/2015:    Scan on 11/9/2015  1:40 PM (below)         Last office visit: 1/17/2019 Pantera Rooney MD

## 2021-06-25 NOTE — TELEPHONE ENCOUNTER
Reason for Call:  Medication or medication refill:    Do you use a [facility]?  Name of the pharmacy and phone number for the current request:   [facility]    Name of the medication requested:   Adderal 30mg  Month supply   Adderal 5mg Month Supply    Other request: n/a    Can we leave a detailed message on this number? Yes    Phone number patient can be reached at:   Cell number on file:    Telephone Information:   Mobile [phone]       Best Time: anytime    Call taken on 6/8/2021 at 10:13 AM by Yuliet Kebede

## 2021-07-03 NOTE — ADDENDUM NOTE
Addendum Note by Marcelle Sales MLT at 4/16/2020 10:00 AM     Author: Marcelle Sales MLT Service: -- Author Type:     Filed: 4/16/2020 11:28 AM Encounter Date: 4/16/2020 Status: Signed    : Marcelle Sales MLT ()    Addended by: MARCELLE SALES on: 4/16/2020 11:28 AM        Modules accepted: Orders

## 2021-07-04 NOTE — TELEPHONE ENCOUNTER
Telephone Encounter by Fernanda Dickerson CMA at 6/8/2021 10:25 AM     Author: Fernanda Dickerson CMA Service: -- Author Type: Certified Medical Assistant    Filed: 6/8/2021 10:36 AM Encounter Date: 6/8/2021 Status: Signed    : Fernanda Dickerson CMA (Certified Medical Assistant)       Medication: Adderall 30 mg and 5 mg  Last Date Filled 5/18 # 15   pulled: YES         Only PCP Prescribing? : YES  Taken as prescribed from physician notes? YES     CSA in last year: YES  5/20  Random Utox in last year: YES  5/20  (if any of the above answer NO - schedule with PCP)     Opioids + benzodiazepines? NO  (if the above answer YES - schedule with PCP every 6 months)     Is patient on the Executive Review Team? No        All responses suggest: Refilling prescription    Checked with the pharmacy, no refill was needed at this time, he had one on file with the pharmacy    Fernanda Dickerson CMA ([facility])

## 2021-07-06 ENCOUNTER — COMMUNICATION - HEALTHEAST (OUTPATIENT)
Dept: FAMILY MEDICINE | Facility: CLINIC | Age: 31
End: 2021-07-06

## 2021-07-06 DIAGNOSIS — F90.0 ATTENTION DEFICIT HYPERACTIVITY DISORDER (ADHD), PREDOMINANTLY INATTENTIVE TYPE: ICD-10-CM

## 2021-07-06 RX ORDER — DEXTROAMPHETAMINE SACCHARATE, AMPHETAMINE ASPARTATE MONOHYDRATE, DEXTROAMPHETAMINE SULFATE AND AMPHETAMINE SULFATE 7.5; 7.5; 7.5; 7.5 MG/1; MG/1; MG/1; MG/1
30 CAPSULE, EXTENDED RELEASE ORAL DAILY
Qty: 30 CAPSULE | Refills: 0 | Status: SHIPPED | OUTPATIENT
Start: 2021-07-06 | End: 2021-08-03

## 2021-07-06 NOTE — TELEPHONE ENCOUNTER
Telephone Encounter by Layla Khalil at 7/6/2021 12:59 PM     Author: Layla Khalil Service: -- Author Type: Patient Access    Filed: 7/6/2021  1:00 PM Encounter Date: 7/6/2021 Status: Signed    : Layla Khalil (Patient Access)       Reason for Call:  Medication or medication refill:    dextroamphetamine-amphetamine (ADDERALL XR) 30 MG 24 hr capsule    dextroamphetamine-amphetamine (ADDERALL) 5 mg Tab tablet    Do you use a [facility]?  Name of the pharmacy and phone number for the current request: [id number] - [place], [place] - [email or web address] [facility] [employer] [employer]    Name of the medication requested: dextroamphetamine-amphetamine (ADDERALL XR) 30 MG 24 hr capsule    dextroamphetamine-amphetamine (ADDERALL) 5 mg Tab tablet    Other request:     Can we leave a detailed message on this number? No call back needed    Phone number patient can be reached at: Home number on file [phone] (home)    Best Time:     Call taken on 7/6/2021 at 12:59 PM by Layla Khalil

## 2021-08-03 DIAGNOSIS — F90.0 ATTENTION DEFICIT HYPERACTIVITY DISORDER (ADHD), PREDOMINANTLY INATTENTIVE TYPE: ICD-10-CM

## 2021-08-03 RX ORDER — DEXTROAMPHETAMINE SACCHARATE, AMPHETAMINE ASPARTATE MONOHYDRATE, DEXTROAMPHETAMINE SULFATE AND AMPHETAMINE SULFATE 7.5; 7.5; 7.5; 7.5 MG/1; MG/1; MG/1; MG/1
30 CAPSULE, EXTENDED RELEASE ORAL DAILY
Qty: 30 CAPSULE | Refills: 0 | Status: SHIPPED | OUTPATIENT
Start: 2021-08-06 | End: 2021-09-02

## 2021-08-03 RX ORDER — DEXTROAMPHETAMINE SACCHARATE, AMPHETAMINE ASPARTATE MONOHYDRATE, DEXTROAMPHETAMINE SULFATE AND AMPHETAMINE SULFATE 1.25; 1.25; 1.25; 1.25 MG/1; MG/1; MG/1; MG/1
5 CAPSULE, EXTENDED RELEASE ORAL DAILY
Qty: 30 CAPSULE | Refills: 0 | Status: SHIPPED | OUTPATIENT
Start: 2021-08-06 | End: 2021-09-02

## 2021-08-03 NOTE — TELEPHONE ENCOUNTER
Reason for Call:  Medication or medication refill:    Do you use a [facility]?  Name of the pharmacy and phone number for the current request:  [facility]    Name of the medication requested: ADDERALL xr 30 MG AND 5 MG xr    Other request: pt states he needs both the 30 mg and the 5 mg, last time only the 30mg was filled    Can we leave a detailed message on this number? YES    Phone number patient can be reached at: Home number on file [phone] (home)    Best Time: anytime    Call taken on 8/3/2021 at 12:50 PM by Brady Lopez

## 2021-08-03 NOTE — CONFIDENTIAL NOTE
dextroamphetamine-amphetamine (ADDERALL XR) 30 MG 24 hr capsule 30 capsule 0 7/6/2021  --   Sig - Route: Take 1 capsule (30 mg total) by mouth daily. - Oral   Sent to pharmacy as: dextroamphetamine-amphetamine ER 30 mg 24hr capsule,extend release (ADDERALL XR)   Earliest Fill Date: 7/6/2021   E-Prescribing Status: Receipt confirmed by pharmacy (7/6/2021  5:24 PM CDT)      Disp Refills Start End SUNNY   dextroamphetamine-amphetamine (ADDERALL) 5 mg Tab tablet 30 tablet 0 5/28/2021 7/2/2021 No   Sig - Route: Take 1 tablet by mouth daily. Take along with 30mg tab to equal 35 mg daily - Oral   Sent to pharmacy as: dextroamphetamine-amphetamine 5 mg tablet (AdderalL)   Earliest Fill Date: 5/28/2021   Notes to Pharmacy: RX early as he will be leaving town - end date reflects when he would run out given previous refill   E-Prescribing Status: Receipt confirmed by pharmacy (5/20/2021  2:17 PM CDT)

## 2021-09-01 DIAGNOSIS — F90.0 ATTENTION DEFICIT HYPERACTIVITY DISORDER (ADHD), PREDOMINANTLY INATTENTIVE TYPE: ICD-10-CM

## 2021-09-01 NOTE — TELEPHONE ENCOUNTER
Reason for Call:  Medication or medication refill:    Do you use a [facility]?  Name of the pharmacy and phone number for the current request:  [facility] in [place]-updated pharm    Name of the medication requested:       amphetamine-dextroamphetamine (ADDERALL XR) 30 MG 24 hr capsule 30 capsule 0 8/6/2021  No   Sig - Route: Take 1 capsule (30 mg) by mouth daily - Oral       Pt states the 5mg should NOT be in the XR not sure why he got that last time.   amphetamine-dextroamphetamine (ADDERALL XR) 5 MG 24 hr capsule 30 capsule 0 8/6/2021  --   Sig - Route: Take 1 capsule (5 mg) by mouth daily Take with 30 mg pill for total of 35 mg daily - Oral       Other request: nsa    Can we leave a detailed message on this number? YES    Phone number patient can be reached at: Cell number on file:    Telephone Information:   Mobile [phone]       Best Time: any    Call taken on 9/1/2021 at 12:23 PM by Pam J. Behr

## 2021-09-02 ENCOUNTER — TELEPHONE (OUTPATIENT)
Dept: FAMILY MEDICINE | Facility: CLINIC | Age: 31
End: 2021-09-02

## 2021-09-02 RX ORDER — DEXTROAMPHETAMINE SACCHARATE, AMPHETAMINE ASPARTATE MONOHYDRATE, DEXTROAMPHETAMINE SULFATE AND AMPHETAMINE SULFATE 7.5; 7.5; 7.5; 7.5 MG/1; MG/1; MG/1; MG/1
30 CAPSULE, EXTENDED RELEASE ORAL DAILY
Qty: 30 CAPSULE | Refills: 0 | Status: SHIPPED | OUTPATIENT
Start: 2021-09-03 | End: 2021-10-01

## 2021-09-02 RX ORDER — DEXTROAMPHETAMINE SACCHARATE, AMPHETAMINE ASPARTATE MONOHYDRATE, DEXTROAMPHETAMINE SULFATE AND AMPHETAMINE SULFATE 1.25; 1.25; 1.25; 1.25 MG/1; MG/1; MG/1; MG/1
5 CAPSULE, EXTENDED RELEASE ORAL DAILY
Qty: 30 CAPSULE | Refills: 0 | Status: SHIPPED | OUTPATIENT
Start: 2021-09-03 | End: 2021-10-01

## 2021-09-02 NOTE — TELEPHONE ENCOUNTER
Medication: Dextroamphetamine-amphetamine ER 5 mg  Last Date Filled 8/4/21    Medication: Dextroamphetamine-amphetamine ER 30 mg  Last Date Filled 8/4/21     pulled: YES         Only PCP Prescribing? : YES  Taken as prescribed from physician notes? YES    CSA in last year: YES  Random Utox in last year: YES  Opioids + benzodiazepines? NO    Is patient on the Executive Review Team? NO      All responses suggest: Refilling prescription

## 2021-09-02 NOTE — TELEPHONE ENCOUNTER
Reason for Call:  Other call back    Detailed comments: pt calling status of refill for:  Adderall 30mg  Adderall 5mg    Pt will be out of both meds on 09/03/2021    [facility]arm:  [facility]    Phone Number Patient can be reached at: Cell number on file:    Telephone Information:   Mobile [phone]       Best Time: anytime    Can we leave a detailed message on this number? YES    Call taken on 9/2/2021 at 1:53 PM by Yuliet Kebede

## 2021-10-01 DIAGNOSIS — F90.0 ATTENTION DEFICIT HYPERACTIVITY DISORDER (ADHD), PREDOMINANTLY INATTENTIVE TYPE: ICD-10-CM

## 2021-10-01 DIAGNOSIS — Z79.899 CONTROLLED SUBSTANCE AGREEMENT SIGNED: ICD-10-CM

## 2021-10-01 RX ORDER — DEXTROAMPHETAMINE SACCHARATE, AMPHETAMINE ASPARTATE MONOHYDRATE, DEXTROAMPHETAMINE SULFATE AND AMPHETAMINE SULFATE 1.25; 1.25; 1.25; 1.25 MG/1; MG/1; MG/1; MG/1
5 CAPSULE, EXTENDED RELEASE ORAL DAILY
Qty: 30 CAPSULE | Refills: 0 | Status: SHIPPED | OUTPATIENT
Start: 2021-10-03 | End: 2021-11-03

## 2021-10-01 RX ORDER — DEXTROAMPHETAMINE SACCHARATE, AMPHETAMINE ASPARTATE MONOHYDRATE, DEXTROAMPHETAMINE SULFATE AND AMPHETAMINE SULFATE 7.5; 7.5; 7.5; 7.5 MG/1; MG/1; MG/1; MG/1
30 CAPSULE, EXTENDED RELEASE ORAL DAILY
Qty: 30 CAPSULE | Refills: 0 | Status: SHIPPED | OUTPATIENT
Start: 2021-10-03 | End: 2021-11-03

## 2021-10-01 NOTE — TELEPHONE ENCOUNTER
Reason for Call:  Medication or medication refill:    Do you use a [facility]?  Name of the pharmacy and phone number for the current request:  [facility]    Name of the medication requested: ADDERALL 30 MG, ADDERALL 5MG    Other request: NONE    Can we leave a detailed message on this number? YES    Phone number patient can be reached at: Home number on file [phone] (home)    Best Time: ANYTIME    Call taken on 10/1/2021 at 2:37 PM by Brady Lopez

## 2021-10-02 NOTE — CONFIDENTIAL NOTE
Filed:  Encounter Date: 5/20/2021 Status: (Other)       Non-Opioid Controlled Substance Agreement  Diagnosis: ADHD  Medication: Adderall XR 30, Adderall 5 mg IR   Quantity per month: 30/30  Number of refills before follow up visit: 6 months - may be e visit  CSA: 5/20/21  Urine drug scree: 5/20/21    Last PRESCRIPTION 9/3/21    Fill Date ID   Written Drug Qty Days Prescriber Rx # Pharmacy Refill   Daily Dose* Pymt Type      09/03/2021  1   09/02/2021  Dextroamp-Amphet Er 30 MG Cap    30.00  30 Ma Win   [id number]   [facility]   [place]   09/03/2021  1   09/02/2021  Dextroamp-Amphet Er 5 MG Cap    30.00  30 Ma Win   [id number]   [facility]   [place]   08/04/2021  1   08/03/2021  Dextroamp-Amphet Er 5 MG Cap    30.00  30 Ma Win   [id number]   [facility]   [place]   08/04/2021  1   08/03/2021  Dextroamp-Amphet Er 30 MG Cap    30.00  30 Ma Win   [id number]   [facility]   [place]   07/06/2021  1   07/06/2021  Dextroamp-Amphet Er 30 MG Cap    30.00  30 Ma Win   [id number]   [facility]   [place]   06/08/2021  1   05/20/2021  Dextroamp-Amphetamine 5 MG Tab    30.00  30 Ma Win   [id number]   [facility]   [place]   06/08/2021  1   05/20/2021  Dextroamp-Amphet Er 30 MG Cap    30.00  30 Ma Win   [id number]   [facility]   [place]   05/18/2021  1   05/18/2021  Dextroamp-Amphet Er 30 MG Cap    15.00  15 Ma Win   [id number]   [facility]   [place]   05/18/2021  1   05/18/2021  Dextroamp-Amphetamine 5 MG Tab    15.00  15 Ma Win   [id number]   [facility]   0/0

## 2021-10-11 ENCOUNTER — HEALTH MAINTENANCE LETTER (OUTPATIENT)
Age: 31
End: 2021-10-11

## 2021-11-01 ENCOUNTER — TELEPHONE (OUTPATIENT)
Dept: FAMILY MEDICINE | Facility: CLINIC | Age: 31
End: 2021-11-01

## 2021-11-01 DIAGNOSIS — F90.0 ATTENTION DEFICIT HYPERACTIVITY DISORDER (ADHD), PREDOMINANTLY INATTENTIVE TYPE: ICD-10-CM

## 2021-11-01 NOTE — TELEPHONE ENCOUNTER
Reason for Call:  Medication or medication refill:    Do you use a [facility]?  Name of the pharmacy and phone number for the current request:    [facility]    Name of the medication requested:   Adderall    Other request: n/a    Can we leave a detailed message on this number? YES    Phone number patient can be reached at: Cell number on file:    Telephone Information:   Mobile [phone]       Best Time: anytime    Call taken on 11/1/2021 at 12:29 PM by Yuliet Kebede

## 2021-11-03 RX ORDER — DEXTROAMPHETAMINE SACCHARATE, AMPHETAMINE ASPARTATE MONOHYDRATE, DEXTROAMPHETAMINE SULFATE AND AMPHETAMINE SULFATE 7.5; 7.5; 7.5; 7.5 MG/1; MG/1; MG/1; MG/1
30 CAPSULE, EXTENDED RELEASE ORAL DAILY
Qty: 30 CAPSULE | Refills: 0 | Status: SHIPPED | OUTPATIENT
Start: 2021-11-03 | End: 2021-11-30

## 2021-11-03 RX ORDER — DEXTROAMPHETAMINE SACCHARATE, AMPHETAMINE ASPARTATE MONOHYDRATE, DEXTROAMPHETAMINE SULFATE AND AMPHETAMINE SULFATE 1.25; 1.25; 1.25; 1.25 MG/1; MG/1; MG/1; MG/1
5 CAPSULE, EXTENDED RELEASE ORAL DAILY
Qty: 30 CAPSULE | Refills: 0 | Status: SHIPPED | OUTPATIENT
Start: 2021-11-03 | End: 2021-11-30

## 2021-11-03 NOTE — TELEPHONE ENCOUNTER
Refilled. Please emphasize this is his last refill and he will not be allowed another refill until he schedules an appointment.  In-person due to elevated blood pressure last visit.

## 2021-11-30 DIAGNOSIS — F90.0 ATTENTION DEFICIT HYPERACTIVITY DISORDER (ADHD), PREDOMINANTLY INATTENTIVE TYPE: ICD-10-CM

## 2021-11-30 RX ORDER — DEXTROAMPHETAMINE SACCHARATE, AMPHETAMINE ASPARTATE MONOHYDRATE, DEXTROAMPHETAMINE SULFATE AND AMPHETAMINE SULFATE 7.5; 7.5; 7.5; 7.5 MG/1; MG/1; MG/1; MG/1
30 CAPSULE, EXTENDED RELEASE ORAL DAILY
Qty: 30 CAPSULE | Refills: 0 | Status: SHIPPED | OUTPATIENT
Start: 2021-12-03 | End: 2021-12-16

## 2021-11-30 RX ORDER — DEXTROAMPHETAMINE SACCHARATE, AMPHETAMINE ASPARTATE MONOHYDRATE, DEXTROAMPHETAMINE SULFATE AND AMPHETAMINE SULFATE 1.25; 1.25; 1.25; 1.25 MG/1; MG/1; MG/1; MG/1
5 CAPSULE, EXTENDED RELEASE ORAL DAILY
Qty: 30 CAPSULE | Refills: 0 | Status: SHIPPED | OUTPATIENT
Start: 2021-12-03 | End: 2021-12-16

## 2021-11-30 NOTE — TELEPHONE ENCOUNTER
Reason for Call:  Medication or medication refill:    Do you use a [facility]?  Name of the pharmacy and phone number for the current request:  [facility]      Name of the medication requested:   amphetamine-dextroamphetamine (ADDERALL XR) 30 MG 24 hr capsule 30 capsule 0 11/3/2021  No   Sig - Route: Take 1 capsule (30 mg) by mouth daily Please make Follow up visit (evist or virtual) before further refills - Oral     amphetamine-dextroamphetamine (ADDERALL XR) 5 MG 24 hr capsule 30 capsule 0 11/3/2021  No   Sig - Route: Take 1 capsule (5 mg) by mouth daily Take with 30 mg pill for total of 35 mg daily - Please make Follow up visit (evist or virtual) before further refills - Oral         Other request: na    Can we leave a detailed message on this number? YES    Phone number patient can be reached at: Cell number on file:    Telephone Information:   Mobile [phone]       Best Time: any    Call taken on 11/30/2021 at 2:12 PM by Pam J. Behr

## 2021-12-01 NOTE — TELEPHONE ENCOUNTER
11/03/2021  2   11/03/2021  Dextroamp-Amphet Er 30 MG Cap    30.00  30 Je Omar   [id number]   [facility]   [place]   11/03/2021  2   11/03/2021  Dextroamp-Amphet Er 5 MG Cap    30.00  30 Je Omar   [id number]   [facility]   [place]   10/03/2021  1   10/01/2021  Dextroamp-Amphet Er 5 MG Cap    30.00  30 Ma Win   [id number]   [facility]   [place]   10/03/2021  1   10/01/2021  Dextroamp-Amphet Er 30 MG Cap    30.00  30 Ma Win   [id number]   [facility]   [place]   09/03/2021  1   09/02/2021  Dextroamp-Amphet Er 30 MG Cap    30.00  30 Ma Win   [id number]   [facility]   [place]   09/03/2021  1   09/02/2021  Dextroamp-Amphet Er 5 MG Cap    30.00  30 Ma Win   [id number]   [facility]   [place]

## 2021-12-16 ENCOUNTER — VIRTUAL VISIT (OUTPATIENT)
Dept: FAMILY MEDICINE | Facility: CLINIC | Age: 31
End: 2021-12-16
Payer: COMMERCIAL

## 2021-12-16 DIAGNOSIS — Z23 HIGH PRIORITY FOR 2019-NCOV VACCINE: ICD-10-CM

## 2021-12-16 DIAGNOSIS — F90.0 ATTENTION DEFICIT HYPERACTIVITY DISORDER (ADHD), PREDOMINANTLY INATTENTIVE TYPE: Primary | ICD-10-CM

## 2021-12-16 DIAGNOSIS — I10 BENIGN ESSENTIAL HYPERTENSION: ICD-10-CM

## 2021-12-16 PROCEDURE — 99213 OFFICE O/P EST LOW 20 MIN: CPT | Mod: 95 | Performed by: FAMILY MEDICINE

## 2021-12-16 RX ORDER — DEXTROAMPHETAMINE SACCHARATE, AMPHETAMINE ASPARTATE MONOHYDRATE, DEXTROAMPHETAMINE SULFATE AND AMPHETAMINE SULFATE 7.5; 7.5; 7.5; 7.5 MG/1; MG/1; MG/1; MG/1
30 CAPSULE, EXTENDED RELEASE ORAL DAILY
Qty: 30 CAPSULE | Refills: 0 | Status: SHIPPED | OUTPATIENT
Start: 2021-12-30 | End: 2022-02-01

## 2021-12-16 RX ORDER — DEXTROAMPHETAMINE SACCHARATE, AMPHETAMINE ASPARTATE MONOHYDRATE, DEXTROAMPHETAMINE SULFATE AND AMPHETAMINE SULFATE 1.25; 1.25; 1.25; 1.25 MG/1; MG/1; MG/1; MG/1
5 CAPSULE, EXTENDED RELEASE ORAL DAILY
Qty: 30 CAPSULE | Refills: 0 | Status: SHIPPED | OUTPATIENT
Start: 2021-12-30 | End: 2022-02-01

## 2021-12-16 NOTE — PROGRESS NOTES
"Sergio is a 31 year old who is being evaluated via a billable video visit.      How would you like to obtain your AVS? MyChart  If the video visit is dropped, the invitation should be resent by: Text to Cellphone:  [phone]  Will anyone else be joining your video visit? No      Video Start Time: 5:01 PM    Assessment & Plan     Attention deficit hyperactivity disorder (ADHD), predominantly inattentive type  continue  - amphetamine-dextroamphetamine (ADDERALL XR) 30 MG 24 hr capsule  Dispense: 30 capsule; Refill: 0  - amphetamine-dextroamphetamine (ADDERALL XR) 5 MG 24 hr capsule  Dispense: 30 capsule; Refill: 0  CSA and urine drug screen up to date - due 6 months    Benign essential hypertension  Did not tolerate amlodipine  Return for two blood pressure checks    High priority for 2019-nCoV vaccine  Future, when he comes for BP checks  - COVID-19,PF,MODERNA (18+ Yrs BOOSTER .25mL)  Return in about 1 week (around 12/23/2021) for BP check.    Luana Ochoa MD  [facility]    Subjective   Sergio is a 31 year old who presents for the following health issues     HPI     Elevated blood pressure  At our last visit on 5/20/21, I started Sergio on amlodipine for elevated blood pressure.  He say it made him feel sluggish and tired.  He is an  and he cannot afford to feel this way, though I had not hear of these side effects for him until our discussion today.  I also discussed today effects of ongoing elevated blood pressure      BP Readings from Last 6 Encounters:   05/20/21 (!) 150/88   04/16/20 (!) 144/84      - No smoking   - \"I don't have the best diet\"   - Wife might have gotten a blood pressure cuff      ADHD - Adderall 35 mg daily continues to work well   - no palpitations  - sleeps pretty good,  - wife says he snores sometimes   - no change in weight      Review of Systems   See above      Objective           Vitals:  No vitals were obtained today due to virtual " visit.    Physical Exam   GENERAL: Healthy, alert and no distress  EYES: Eyes grossly normal to inspection.  No discharge or erythema, or obvious scleral/conjunctival abnormalities.  RESP: No audible wheeze, cough, or visible cyanosis.  No visible retractions or increased work of breathing.    NEURO: Cranial nerves grossly intact.  Mentation and speech appropriate for age.  PSYCH: Mentation appears normal, affect normal/bright, judgement and insight intact, normal speech and appearance well-groomed.        Video-Visit Details    Type of service:  Video Visit    Video End Time:5:14 PM    Originating Location (pt. Location): Home    Distant Location (provider location):  [facility]     Platform used for Video Visit: [employer]

## 2021-12-16 NOTE — PATIENT INSTRUCTIONS
" Our goal is to discuss this at least every five years with all patients starting at age 18.  Advanced directives are a document that lets us know who talks for you and what your desires are in a medical situation where you are unable to talk for yourself    Here is a useful web site that includes a link to the honoring choices form (under how do I document my choices?):     [email or web address]    The three must haves for this form are  1) who speaks for you (health care agent if you cannot speak for yourself, and what 'schmidt' they have  2) what interventions you want if yo are permanently unconscious  3) making this legal - either by notary, or by two signatures of witnesses, neither of who is your health care agent    There are other parts to the form that are optional    No rush for this! Just something we are supposed to discuss every so often.    If you complete the form, and can make a pdf of it, you can send me a message with that pdf attached and it will become part of your chart here; if you cannot make an electronic copy to send by YouMail, mailing the document is also fine.  You might also consider putting it on your fridge under \"Emergency Instructions.,\" as well as having a copy for yourself and your family.    Please let me know if you have any questions.    Luana Ochoa MD    "

## 2022-01-18 VITALS
SYSTOLIC BLOOD PRESSURE: 144 MMHG | BODY MASS INDEX: 23.33 KG/M2 | TEMPERATURE: 98.5 F | HEART RATE: 88 BPM | WEIGHT: 164.9 LBS | DIASTOLIC BLOOD PRESSURE: 84 MMHG

## 2022-01-18 VITALS
WEIGHT: 173.1 LBS | TEMPERATURE: 98.2 F | BODY MASS INDEX: 24.49 KG/M2 | HEART RATE: 93 BPM | SYSTOLIC BLOOD PRESSURE: 150 MMHG | DIASTOLIC BLOOD PRESSURE: 88 MMHG | OXYGEN SATURATION: 98 %

## 2022-02-01 DIAGNOSIS — F90.0 ATTENTION DEFICIT HYPERACTIVITY DISORDER (ADHD), PREDOMINANTLY INATTENTIVE TYPE: ICD-10-CM

## 2022-02-01 RX ORDER — DEXTROAMPHETAMINE SACCHARATE, AMPHETAMINE ASPARTATE MONOHYDRATE, DEXTROAMPHETAMINE SULFATE AND AMPHETAMINE SULFATE 1.25; 1.25; 1.25; 1.25 MG/1; MG/1; MG/1; MG/1
5 CAPSULE, EXTENDED RELEASE ORAL DAILY
Qty: 30 CAPSULE | Refills: 0 | Status: SHIPPED | OUTPATIENT
Start: 2022-02-01 | End: 2022-02-25

## 2022-02-01 RX ORDER — DEXTROAMPHETAMINE SACCHARATE, AMPHETAMINE ASPARTATE MONOHYDRATE, DEXTROAMPHETAMINE SULFATE AND AMPHETAMINE SULFATE 7.5; 7.5; 7.5; 7.5 MG/1; MG/1; MG/1; MG/1
30 CAPSULE, EXTENDED RELEASE ORAL DAILY
Qty: 30 CAPSULE | Refills: 0 | Status: SHIPPED | OUTPATIENT
Start: 2022-02-01 | End: 2022-02-25

## 2022-02-01 NOTE — TELEPHONE ENCOUNTER
Reason for Call:  Medication or medication refill:    Do you use a [facility]?  Name of the pharmacy and phone number for the current request:    [facility]    Name of the medication requested:   Adderall 30mg  Adderall 5mg    Other request: n/a    Can we leave a detailed message on this number? YES    Phone number patient can be reached at: Cell number on file:    Telephone Information:   Mobile [phone]       Best Time: anytime    Call taken on 2/1/2022 at 8:40 AM by Yuliet Kebede

## 2022-02-01 NOTE — TELEPHONE ENCOUNTER
My notes 12/16/21:     Attention deficit hyperactivity disorder (ADHD), predominantly inattentive type  continue  - amphetamine-dextroamphetamine (ADDERALL XR) 30 MG 24 hr capsule  Dispense: 30 capsule; Refill: 0  - amphetamine-dextroamphetamine (ADDERALL XR) 5 MG 24 hr capsule  Dispense: 30 capsule; Refill: 0  CSA and urine drug screen up to date - due 6 months    01/06/2022  1   12/16/2021  Dextroamp-Amphet Er 5 MG Cap    30.00  30 Ma Win   [id number]   [facility]   [place]   01/03/2022  1   12/16/2021  Dextroamp-Amphet Er 30 MG Cap    30.00  30 Ma Win   [id number]   [facility]   [place]   12/03/2021  1   11/30/2021  Dextroamp-Amphet Er 30 MG Cap    30.00  30 Ma Win   [id number]   [facility]   [place]   12/03/2021  1   11/30/2021  Dextroamp-Amphet Er 5 MG Cap    30.00  30 Ma Win   [id number]   [facility]   [place]   11/03/2021  2   11/03/2021  Dextroamp-Amphet Er 30 MG Cap    30.00  30 Je Omar   [id number]   [facility]   [place]   11/03/2021  2   11/03/2021  Dextroamp-Amphet Er 5 MG Cap    30.00  30 Je Omar   [id number]   [facility]   [place]   10/03/2021  1   10/01/2021  Dextroamp-Amphet Er 5 MG Cap    30.00  30 Ma Win   [id number]   [facility]   [place]

## 2022-02-01 NOTE — TELEPHONE ENCOUNTER
Medication: Adderall XR 5mg  Last Date Filled 12/30/21    Medication: Adderall XR 30 mg  Last Date Filled 12/30/21     pulled: PROVIDER TO PULL FROM Epic.   Only PCP Prescribing? PROVIDER TO PULL  FROM Epic.    Taken as prescribed from physician notes?     CSA in last year: YES  Random Utox in last year: YES    On opioids in addition to benzodiazepines? n/a     Use of Adderall was last discussed with Dr. Ochoa on 12/16/21.    Is patient on the Executive Review Team? No

## 2022-02-25 DIAGNOSIS — F90.0 ATTENTION DEFICIT HYPERACTIVITY DISORDER (ADHD), PREDOMINANTLY INATTENTIVE TYPE: ICD-10-CM

## 2022-02-25 RX ORDER — DEXTROAMPHETAMINE SACCHARATE, AMPHETAMINE ASPARTATE MONOHYDRATE, DEXTROAMPHETAMINE SULFATE AND AMPHETAMINE SULFATE 7.5; 7.5; 7.5; 7.5 MG/1; MG/1; MG/1; MG/1
30 CAPSULE, EXTENDED RELEASE ORAL DAILY
Qty: 30 CAPSULE | Refills: 0 | Status: SHIPPED | OUTPATIENT
Start: 2022-02-25 | End: 2022-03-25

## 2022-02-25 RX ORDER — DEXTROAMPHETAMINE SACCHARATE, AMPHETAMINE ASPARTATE MONOHYDRATE, DEXTROAMPHETAMINE SULFATE AND AMPHETAMINE SULFATE 1.25; 1.25; 1.25; 1.25 MG/1; MG/1; MG/1; MG/1
5 CAPSULE, EXTENDED RELEASE ORAL DAILY
Qty: 30 CAPSULE | Refills: 0 | Status: SHIPPED | OUTPATIENT
Start: 2022-02-25 | End: 2022-03-25

## 2022-02-25 NOTE — TELEPHONE ENCOUNTER
amphetamine-dextroamphetamine (ADDERALL XR) 30 MG 24 hr capsule 30 capsule 0 2/1/2022  No   Sig - Route: Take 1 capsule (30 mg) by mouth daily - Oral   Sent to pharmacy as: Amphetamine-Dextroamphet ER 30 MG Oral Capsule Extended Release 24 Hour (ADDERALL XR)   Class: E-Prescribe   Earliest Fill Date: 2/1/2022   Order: [id number]   E-Prescribing Status: Receipt confirmed by pharmacy (2/1/2022  5:30 PM CST)     amphetamine-dextroamphetamine (ADDERALL XR) 5 MG 24 hr capsule 30 capsule 0 2/1/2022  No   Sig - Route: Take 1 capsule (5 mg) by mouth daily Take with 30 mg pill for total of 35 mg daily - Oral   Sent to pharmacy as: Amphetamine-Dextroamphet ER 5 MG Oral Capsule Extended Release 24 Hour (ADDERALL XR)   Class: E-Prescribe   Earliest Fill Date: 2/1/2022   Order: [id number]   E-Prescribing Status: Receipt confirmed by pharmacy (2/1/2022  5:30 PM CST     PRESCRIPTION due 3/3/22 and would last until 4/2/22    I will approver early refill, but end date is still 4/2/22

## 2022-02-25 NOTE — TELEPHONE ENCOUNTER
Patient is going out of town when he is due for refill. Patient leaves Sunday Monday for [email or web address]. Please advise     Pending Prescriptions:                       Disp   Refills    amphetamine-dextroamphetamine (ADDERALL X*30 cap*0            Sig: Take 1 capsule (30 mg) by mouth daily    amphetamine-dextroamphetamine (ADDERALL X*30 cap*0            Sig: Take 1 capsule (5 mg) by mouth daily Take with 30           mg pill for total of 35 mg daily

## 2022-03-24 DIAGNOSIS — F90.0 ATTENTION DEFICIT HYPERACTIVITY DISORDER (ADHD), PREDOMINANTLY INATTENTIVE TYPE: ICD-10-CM

## 2022-03-24 NOTE — TELEPHONE ENCOUNTER
Reason for Call:  Medication or medication refill:    Do you use a [facility]?  Name of the pharmacy and phone number for the current request:  [facility]    Name of the medication requested:   amphetamine-dextroamphetamine (ADDERALL XR) 30 MG 24 hr capsule 30 capsule 0 2/25/2022 4/2/2022 No   Sig - Route: Take 1 capsule (30 mg) by mouth daily OK early  due to travel, but end date is 4/2/22 - Oral     amphetamine-dextroamphetamine (ADDERALL XR) 5 MG 24 hr capsule 30 capsule 0 2/25/2022 4/2/2022 No   Sig - Route: Take 1 capsule (5 mg) by mouth daily Take with 30 mg pill for total of 35 mg daily. OK early  due to travel, but end date is 4/2/22 - Oral         Other request: n/a    Can we leave a detailed message on this number? YES    Phone number patient can be reached at: Cell number on file:    Telephone Information:   Mobile [phone]       Best Time: any    Call taken on 3/24/2022 at 4:42 PM by Pam J. Behr

## 2022-03-25 RX ORDER — DEXTROAMPHETAMINE SACCHARATE, AMPHETAMINE ASPARTATE MONOHYDRATE, DEXTROAMPHETAMINE SULFATE AND AMPHETAMINE SULFATE 7.5; 7.5; 7.5; 7.5 MG/1; MG/1; MG/1; MG/1
30 CAPSULE, EXTENDED RELEASE ORAL DAILY
Qty: 30 CAPSULE | Refills: 0 | Status: SHIPPED | OUTPATIENT
Start: 2022-04-02 | End: 2022-04-29

## 2022-03-25 RX ORDER — DEXTROAMPHETAMINE SACCHARATE, AMPHETAMINE ASPARTATE MONOHYDRATE, DEXTROAMPHETAMINE SULFATE AND AMPHETAMINE SULFATE 1.25; 1.25; 1.25; 1.25 MG/1; MG/1; MG/1; MG/1
5 CAPSULE, EXTENDED RELEASE ORAL DAILY
Qty: 30 CAPSULE | Refills: 0 | Status: SHIPPED | OUTPATIENT
Start: 2022-04-02 | End: 2022-04-29

## 2022-03-25 NOTE — TELEPHONE ENCOUNTER
Last PRESCRIPTION sent early due to travel, but end date was 4/2 - so will post-date to that day    amphetamine-dextroamphetamine (ADDERALL XR) 30 MG 24 hr capsule 30 capsule 0 2/25/2022 4/2/2022 No   Sig - Route: Take 1 capsule (30 mg) by mouth daily OK early  due to travel, but end date is 4/2/22 - Oral   Sent to pharmacy as: Amphetamine-Dextroamphet ER 30 MG Oral Capsule Extended Release 24 Hour (ADDERALL XR)   Class: E-Prescribe   Earliest Fill Date: 2/25/2022   Order: [id number]   E-Prescribing Status: Receipt confirmed by pharmacy (2/25/2022  5:03 PM CST)     amphetamine-dextroamphetamine (ADDERALL XR) 5 MG 24 hr capsule 30 capsule 0 2/25/2022 4/2/2022 No   Sig - Route: Take 1 capsule (5 mg) by mouth daily Take with 30 mg pill for total of 35 mg daily. OK early  due to travel, but end date is 4/2/22 - Oral   Sent to pharmacy as: Amphetamine-Dextroamphet ER 5 MG Oral Capsule Extended Release 24 Hour (ADDERALL XR)   Class: E-Prescribe   Earliest Fill Date: 2/25/2022   Order: [id number]   E-Prescribing Status: Receipt confirmed by pharmacy (2/25/2022  5:03 PM CST)     02/26/2022  2   02/25/2022  Dextroamp-Amphet Er 5 MG Cap    30.00  30 Ma Win   [id number]   [facility]   [place]   02/25/2022  2   02/25/2022  Dextroamp-Amphet Er 30 MG Cap    30.00  30 Ma Win   [id number]   [facility]   [place]   02/03/2022  2   02/01/2022  Dextroamp-Amphet Er 5 MG Cap    30.00  30 Ma Win   [id number]   [facility]   [place]   02/01/2022  2   02/01/2022  Dextroamp-Amphet Er 30 MG Cap    30.00  30 Ma Win   [id number]   [facility]   [place]   01/06/2022  2   12/16/2021  Dextroamp-Amphet Er 5 MG Cap    30.00  30 Ma Win   [id number]   [facility]   [place]   01/03/2022  2   12/16/2021  Dextroamp-Amphet Er 30 MG Cap    30.00  30 Ma Win   [id number]   [facility]   [place]

## 2022-03-25 NOTE — TELEPHONE ENCOUNTER
Medication: Adderall      CSA in last year: YES    Random Utox in last year: YES  (if any of the above answer NO - schedule with PCP)     On opioids in addition to benzodiazepines? No  (if the above answer YES - schedule with PCP every 6 months)           PROVIDER TO PULL THE FOLLOWING FROM  :    1. Last date filled? 2/25/22  2.   Only PCP Prescribing? yes  3.   Taken as prescribed from physician notes? yes

## 2022-04-28 DIAGNOSIS — F90.0 ATTENTION DEFICIT HYPERACTIVITY DISORDER (ADHD), PREDOMINANTLY INATTENTIVE TYPE: ICD-10-CM

## 2022-04-29 RX ORDER — DEXTROAMPHETAMINE SACCHARATE, AMPHETAMINE ASPARTATE MONOHYDRATE, DEXTROAMPHETAMINE SULFATE AND AMPHETAMINE SULFATE 7.5; 7.5; 7.5; 7.5 MG/1; MG/1; MG/1; MG/1
30 CAPSULE, EXTENDED RELEASE ORAL DAILY
Qty: 30 CAPSULE | Refills: 0 | Status: SHIPPED | OUTPATIENT
Start: 2022-05-01 | End: 2022-06-01

## 2022-04-29 RX ORDER — DEXTROAMPHETAMINE SACCHARATE, AMPHETAMINE ASPARTATE MONOHYDRATE, DEXTROAMPHETAMINE SULFATE AND AMPHETAMINE SULFATE 1.25; 1.25; 1.25; 1.25 MG/1; MG/1; MG/1; MG/1
5 CAPSULE, EXTENDED RELEASE ORAL DAILY
Qty: 30 CAPSULE | Refills: 0 | Status: SHIPPED | OUTPATIENT
Start: 2022-05-01 | End: 2022-06-01

## 2022-04-29 NOTE — TELEPHONE ENCOUNTER
04/04/2022  1   03/25/2022  Dextroamp-Amphet Er 5 MG Cap    30.00  30 Ma Win   [id number]   [facility]   [place]   04/02/2022  1   03/25/2022  Dextroamp-Amphet Er 30 MG Cap    30.00  30 Ma Win   [id number]   [facility]   [place]   02/26/2022  1   02/25/2022  Dextroamp-Amphet Er 5 MG Cap    30.00  30 Ma Win   [id number]   [facility] [place]   [place]   02/25/2022  1   02/25/2022  Dextroamp-Amphet Er 30 MG Cap    30.00  30 Ma Win   [id number]   [facility] [place]   [place]   02/03/2022  1   02/01/2022  Dextroamp-Amphet Er 5 MG Cap    30.00  30 Ma Win   [id number]   [facility]   [place]   02/01/2022  1   02/01/2022  Dextroamp-Amphet Er 30 MG Cap    30.00  30 Ma Win   [id number]   [facility] [place]   [place]   01/06/2022  1   12/16/2021  Dextroamp-Amphet Er 5 MG Cap    30.00  30 Ma Win   [id number]   [facility]   [place]         
Cont[place] Substance Refill Request for Adderall 5 mg, Adderall 30 mg    Last refill: Adderall 5 mg - 4/2/2022 for 30 with 0        Adderall 30 mg - 4/2/2022 for 30 with 0    Last clinic visit: 12/16/2021     Clinic visit frequency required: Q 3 months  Next appt: Nothing scheduled    Controlled substance agreement on file: Yes:  Date 5/24/2021.    Documentation in problem list reviewed:  Yes    Processing:  Rx to be electronically transmitted to pharmacy by provider      Sudheer Ames RN  [facility]        
Reason for Call:  Medication or medication refill:    Do you use a [facility]?  Name of the pharmacy and phone number for the current request:    [facility]    Name of the medication requested:   Adderall 30mg  Adderall 5mg    Other request: n/a    Can we leave a detailed message on this number? YES    Phone number patient can be reached at: Home number on file [phone] (home)    Best Time: anytime    Call taken on 4/28/2022 at 1:50 PM by Yuliet Kebede      
None

## 2022-05-31 ENCOUNTER — TELEPHONE (OUTPATIENT)
Dept: FAMILY MEDICINE | Facility: CLINIC | Age: 32
End: 2022-05-31
Payer: COMMERCIAL

## 2022-05-31 DIAGNOSIS — F90.0 ATTENTION DEFICIT HYPERACTIVITY DISORDER (ADHD), PREDOMINANTLY INATTENTIVE TYPE: ICD-10-CM

## 2022-05-31 NOTE — TELEPHONE ENCOUNTER
Reason for Call:  Medication or medication refill:    Do you use a [facility]?  Name of the pharmacy and phone number for the current request:  [facility]    Name of the medication requested:     amphetamine-dextroamphetamine (ADDERALL XR) 30 MG 24 hr capsule 30 capsule 0 5/1/2022  No   Sig - Route: Take 1 capsule (30 mg) by mouth daily Last prescription OK early  due to travel, but end date is 4/2/22 - Oral       amphetamine-dextroamphetamine (ADDERALL XR) 5 MG 24 hr capsule 30 capsule 0 5/1/2022  No   Sig - Route: Take 1 capsule (5 mg) by mouth daily Take with 30 mg pill for total of 35 mg daily. Last prescription  early  due to travel, but end date is 4/2/22 - Oral       Other request: n/a    Can we leave a detailed message on this number? YES    Phone number patient can be reached at: Cell number on file:    Telephone Information:   Mobile [phone]       Best Time: any    Call taken on 5/31/2022 at 10:33 AM by Pam J. Behr

## 2022-06-01 RX ORDER — DEXTROAMPHETAMINE SACCHARATE, AMPHETAMINE ASPARTATE MONOHYDRATE, DEXTROAMPHETAMINE SULFATE AND AMPHETAMINE SULFATE 1.25; 1.25; 1.25; 1.25 MG/1; MG/1; MG/1; MG/1
5 CAPSULE, EXTENDED RELEASE ORAL DAILY
Qty: 30 CAPSULE | Refills: 0 | Status: SHIPPED | OUTPATIENT
Start: 2022-06-01 | End: 2022-06-29

## 2022-06-01 RX ORDER — DEXTROAMPHETAMINE SACCHARATE, AMPHETAMINE ASPARTATE MONOHYDRATE, DEXTROAMPHETAMINE SULFATE AND AMPHETAMINE SULFATE 7.5; 7.5; 7.5; 7.5 MG/1; MG/1; MG/1; MG/1
30 CAPSULE, EXTENDED RELEASE ORAL DAILY
Qty: 30 CAPSULE | Refills: 0 | Status: SHIPPED | OUTPATIENT
Start: 2022-06-01 | End: 2022-06-29

## 2022-06-01 NOTE — TELEPHONE ENCOUNTER
Medication: Adderall 30 mg; Adderall 5 mg      CSA in last year: NO    Random Utox in last year: NO  (if any of the above answer NO - schedule with PCP)     On opioids in addition to benzodiazepines? NO  (if the above answer YES - schedule with PCP every 6 months)           PROVIDER TO PULL THE FOLLOWING FROM  :    1. Last date filled?  2.   Only PCP Prescribing?  3.   Taken as prescribed from physician notes?

## 2022-06-09 ENCOUNTER — VIRTUAL VISIT (OUTPATIENT)
Dept: FAMILY MEDICINE | Facility: CLINIC | Age: 32
End: 2022-06-09
Payer: COMMERCIAL

## 2022-06-09 DIAGNOSIS — Z11.59 NEED FOR HEPATITIS C SCREENING TEST: ICD-10-CM

## 2022-06-09 DIAGNOSIS — Z11.4 SCREENING FOR HIV (HUMAN IMMUNODEFICIENCY VIRUS): ICD-10-CM

## 2022-06-09 PROCEDURE — 99207 PR NO CHARGE LOS: CPT | Performed by: FAMILY MEDICINE

## 2022-06-13 ENCOUNTER — TELEPHONE (OUTPATIENT)
Dept: FAMILY MEDICINE | Facility: CLINIC | Age: 32
End: 2022-06-13
Payer: COMMERCIAL

## 2022-06-13 DIAGNOSIS — Z79.899 CONTROLLED SUBSTANCE AGREEMENT SIGNED: Primary | ICD-10-CM

## 2022-06-20 ENCOUNTER — LAB (OUTPATIENT)
Dept: LAB | Facility: CLINIC | Age: 32
End: 2022-06-20
Payer: COMMERCIAL

## 2022-06-20 DIAGNOSIS — Z79.899 CONTROLLED SUBSTANCE AGREEMENT SIGNED: ICD-10-CM

## 2022-06-20 LAB
AMPHETAMINES UR QL SCN: ABNORMAL
BARBITURATES UR QL: ABNORMAL
BENZODIAZ UR QL: ABNORMAL
CANNABINOIDS UR QL SCN: ABNORMAL
COCAINE UR QL: ABNORMAL
CREAT UR-MCNC: 139 MG/DL
OPIATES UR QL SCN: ABNORMAL
OXYCODONE UR QL: ABNORMAL
PCP UR QL SCN: ABNORMAL

## 2022-06-20 PROCEDURE — 80307 DRUG TEST PRSMV CHEM ANLYZR: CPT

## 2022-06-22 ENCOUNTER — TELEPHONE (OUTPATIENT)
Dept: FAMILY MEDICINE | Facility: CLINIC | Age: 32
End: 2022-06-22

## 2022-06-22 NOTE — TELEPHONE ENCOUNTER
Patient had virtual visit when he was supposed to have in person visit. I asked that he be scheduled for in person visit and we would do urine screening at that visit.  He came in for urine drug test but STILL has not schedule in person visit, needed once yearly on medications for ADHD. Please call him to schedule - 20 minute visit is fine

## 2022-06-28 DIAGNOSIS — F90.0 ATTENTION DEFICIT HYPERACTIVITY DISORDER (ADHD), PREDOMINANTLY INATTENTIVE TYPE: ICD-10-CM

## 2022-06-28 NOTE — TELEPHONE ENCOUNTER
Reason for Call:  Medication or medication refill:    Do you use a [facility]?  Name of the pharmacy and phone number for the current request:    [facility]     Name of the medication requested:   Adderall 30mg  Adderall 5mg    Other request: n/a    Can we leave a detailed message on this number? YES    Phone number patient can be reached at: Cell number on file:    No relevant phone numbers on file.       Best Time: anytime      Call taken on 6/28/2022 at 1:41 PM by Yuliet Kebede

## 2022-06-29 ENCOUNTER — TELEPHONE (OUTPATIENT)
Dept: FAMILY MEDICINE | Facility: CLINIC | Age: 32
End: 2022-06-29

## 2022-06-29 RX ORDER — DEXTROAMPHETAMINE SACCHARATE, AMPHETAMINE ASPARTATE MONOHYDRATE, DEXTROAMPHETAMINE SULFATE AND AMPHETAMINE SULFATE 1.25; 1.25; 1.25; 1.25 MG/1; MG/1; MG/1; MG/1
5 CAPSULE, EXTENDED RELEASE ORAL DAILY
Qty: 30 CAPSULE | Refills: 0 | Status: SHIPPED | OUTPATIENT
Start: 2022-07-01 | End: 2022-08-01

## 2022-06-29 RX ORDER — DEXTROAMPHETAMINE SACCHARATE, AMPHETAMINE ASPARTATE MONOHYDRATE, DEXTROAMPHETAMINE SULFATE AND AMPHETAMINE SULFATE 7.5; 7.5; 7.5; 7.5 MG/1; MG/1; MG/1; MG/1
30 CAPSULE, EXTENDED RELEASE ORAL DAILY
Qty: 30 CAPSULE | Refills: 0 | Status: SHIPPED | OUTPATIENT
Start: 2022-07-01 | End: 2022-08-01

## 2022-06-29 NOTE — TELEPHONE ENCOUNTER
Medication: adderall      CSA in last year: NO-, has an up coming appointment    Random Utox in last year: YES  (if any of the above answer NO - schedule with PCP)     On opioids in addition to benzodiazepines? NO  (if the above answer YES - schedule with PCP every 6 months)           PROVIDER TO PULL THE FOLLOWING FROM  :    1. Last date filled?  2.   Only PCP Prescribing?  3.   Taken as prescribed from physician notes?

## 2022-06-29 NOTE — TELEPHONE ENCOUNTER
Reason for call:  Other   Patient called regarding (reason for call): call back  Additional comments: medication refill    Phone number to reach patient:  Cell number on file:    No relevant phone numbers on file.       Best Time:  anytime    Can we leave a detailed message on this number?  YES    Travel screening: Not Applicable

## 2022-06-30 NOTE — TELEPHONE ENCOUNTER
Has appointment 7/22/22 06/02/2022  1   06/01/2022  Dextroamp-Amphet Er 30 MG Cap    30.00  30 Je Omar   [id number]   [facility]   [place]   06/02/2022  2   06/01/2022  Dextroamp-Amphet Er 5 MG Cap    30.00  30 Je Omar   [id number]   [facility] [place]   [place]   05/02/2022  1   04/29/2022  Dextroamp-Amphet Er 5 MG Cap    30.00  30 Ma Win   [id number]   [facility] [place]   [place]   05/02/2022  1   04/29/2022  Dextroamp-Amphet Er 30 MG Cap    30.00  30 Ma Win   [id number]   [facility] [place]   [place]   04/04/2022  1   03/25/2022  Dextroamp-Amphet Er 5 MG Cap    30.00  30 Ma Win     Luana ROZ Don  [facility] [place] [address] [id number]   [facility] ([facility]150[facility]   [place]   04/02/2022  1   03/25/2022  Dextroamp-Amphet Er 30 MG Cap    30.00  30 Ma Win   [id number]   [facility] [place]   02/26/2022  1   02/25/2022  Dextroamp-Amphet Er 5 MG Cap    30.00  30 Ma Win   [id number]   [facility] ([facility]150)   0/0

## 2022-07-17 ENCOUNTER — HEALTH MAINTENANCE LETTER (OUTPATIENT)
Age: 32
End: 2022-07-17

## 2022-07-20 ENCOUNTER — HOSPITAL ENCOUNTER (EMERGENCY)
Facility: CLINIC | Age: 32
Discharge: HOME | End: 2022-07-21
Attending: EMERGENCY MEDICINE | Admitting: EMERGENCY MEDICINE
Payer: COMMERCIAL

## 2022-07-20 ENCOUNTER — APPOINTMENT (OUTPATIENT)
Dept: ULTRASOUND IMAGING | Facility: CLINIC | Age: 32
End: 2022-07-20
Attending: EMERGENCY MEDICINE
Payer: COMMERCIAL

## 2022-07-20 DIAGNOSIS — L03.116 CELLULITIS OF LEFT LOWER EXTREMITY: ICD-10-CM

## 2022-07-20 PROCEDURE — 93971 EXTREMITY STUDY: CPT | Mod: LT

## 2022-07-20 PROCEDURE — 99284 EMERGENCY DEPT VISIT MOD MDM: CPT | Mod: 25

## 2022-07-20 PROCEDURE — 250N000013 HC RX MED GY IP 250 OP 250 PS 637: Performed by: EMERGENCY MEDICINE

## 2022-07-20 RX ORDER — CEPHALEXIN 500 MG/1
500 CAPSULE ORAL ONCE
Status: COMPLETED | OUTPATIENT
Start: 2022-07-20 | End: 2022-07-20

## 2022-07-20 RX ORDER — ACETAMINOPHEN 325 MG/1
975 TABLET ORAL ONCE
Status: COMPLETED | OUTPATIENT
Start: 2022-07-20 | End: 2022-07-20

## 2022-07-20 RX ORDER — IBUPROFEN 600 MG/1
600 TABLET, FILM COATED ORAL ONCE
Status: COMPLETED | OUTPATIENT
Start: 2022-07-20 | End: 2022-07-20

## 2022-07-20 RX ADMIN — CEPHALEXIN 500 MG: 500 CAPSULE ORAL at 22:36

## 2022-07-20 RX ADMIN — IBUPROFEN 600 MG: 600 TABLET ORAL at 22:36

## 2022-07-20 RX ADMIN — ACETAMINOPHEN 975 MG: 325 TABLET ORAL at 22:36

## 2022-07-20 ASSESSMENT — ENCOUNTER SYMPTOMS
COUGH: 0
COLOR CHANGE: 1
SHORTNESS OF BREATH: 0
WOUND: 1

## 2022-07-21 VITALS
OXYGEN SATURATION: 98 % | TEMPERATURE: 98.6 F | DIASTOLIC BLOOD PRESSURE: 80 MMHG | BODY MASS INDEX: 23.03 KG/M2 | SYSTOLIC BLOOD PRESSURE: 137 MMHG | RESPIRATION RATE: 18 BRPM | WEIGHT: 170 LBS | HEIGHT: 72 IN | HEART RATE: 78 BPM

## 2022-07-21 RX ORDER — CEPHALEXIN 500 MG/1
500 CAPSULE ORAL 4 TIMES DAILY
Qty: 28 CAPSULE | Refills: 0 | Status: SHIPPED | OUTPATIENT
Start: 2022-07-21 | End: 2022-07-28

## 2022-07-21 NOTE — ED TRIAGE NOTES
Pt c/o of new wound to the left knee. Pt noticed a lump on his left knee about 3 days ago. Today the wound is open and weeping, with redness up his thigh and down into his calf.

## 2022-07-21 NOTE — ED PROVIDER NOTES
Emergency Department Encounter      NAME: Sergio Cuadra  AGE: 32 year old male  YOB: 1990  MRN: [id number]  EVALUATION DATE & TIME: 7/20/2022 10:00 PM    PCP: Luana Ochoa    ED PROVIDER: Nakul Jay M.D.      Chief Complaint   Patient presents with     Wound Infection         FINAL IMPRESSION:  1. Cellulitis of left lower extremity        MEDICAL DECISION MAKING:    10:27 PM I met with the patient, obtained history, performed an initial exam, and discussed options and plan for diagnostics and treatment here in the ED.   12:11 AM Per nursing report, patient left the Emergency Department without notifying staff. Patient is possibly upstairs with his wife.    This patient is a 32-year-old male who presents with a rash on his left leg.  He says that he noticed a wound on his medial left knee a couple days ago which she thought was a insect bite.  He says that he was probing the area with some tweezers and washed it with hydrogen peroxide.  He says that he did get it to drain somewhat.  However he now has a noticeable area of redness around his left distal thigh and anterior shin adjacent to this lesion.  The area itself  Like a abscess which was drained but had some surrounding cellulitis.  However because the area  was overlying the greater saphenous vein I did a ultrasound to rule out DVT and superficial thrombophlebitis.  The ultrasound was negative.  However when I returned to give the patient his antibiotic prescription for the cellulitis he was gone and I do not believe return to the ER for his antibiotic.  He may still return as his wife is upstairs with their new baby.  The area of cellulitis was outlined while he was in the ER and he was told to make sure he returns if the area of cellulitis gets bigger, to rest and also had to stay off his feet    Pertinent Labs & Imaging studies reviewed. (See chart for details)    The importance of close follow up was discussed. We reviewed warning signs  and symptoms, and I instructed Mr. Cuadra to return to the emergency department immediately if he develops any new or worsening symptoms. I provided additional verbal discharge instructions. Mr. Cuadra expressed understanding and agreement with this plan of care, his questions were answered, and he was discharged in stable condition.       MEDICATIONS GIVEN IN THE EMERGENCY:  Medications   cephALEXin (KEFLEX) capsule 500 mg (500 mg Oral Given 7/20/22 2236)   acetaminophen (TYLENOL) tablet 975 mg (975 mg Oral Given 7/20/22 2236)   ibuprofen (ADVIL/MOTRIN) tablet 600 mg (600 mg Oral Given 7/20/22 2236)       NEW PRESCRIPTIONS STARTED AT TODAY'S ER VISIT:  Discharge Medication List as of 7/21/2022 12:14 AM      START taking these medications    Details   cephALEXin (KEFLEX) 500 MG capsule Take 1 capsule (500 mg) by mouth 4 times daily for 7 days, Disp-28 capsule, R-0, Local Print                =================================================================    HPI    Patient information was obtained from: Patient    Use of : N/A       Sergio Cuadra is a 32 year old male with no past medical history, who presents to the ED via walk-in for the evaluation of wound.    Patient reports he noticed a wound to left knee a couple days ago. He initially thought it was a big bite. Patient notes he started poking wound with tweezers and washed it with hydrogen peroxide. He states he had been draining it intermittently. Patient reports that he has now noticed increased redness to left leg surrounding the wound, left lower leg swelling, and left upper leg pain with walking. Otherwise, he denies any chest pain, shortness of breath, cough, and syncope. No other complaints at this time.    REVIEW OF SYSTEMS   Review of Systems   Respiratory: Negative for cough and shortness of breath.    Cardiovascular: Positive for leg swelling (left lower). Negative for chest pain.   Musculoskeletal:        Positive for left lower leg pain    Skin: Positive for color change (redness to left leg) and wound (left knee).   Neurological: Negative for syncope.   All other systems reviewed and are negative.     PAST MEDICAL HISTORY:  History reviewed. No pertinent past medical history.    PAST SURGICAL HISTORY:  History reviewed. No pertinent surgical history.    CURRENT MEDICATIONS:    No current facility-administered medications for this encounter.    Current Outpatient Medications:      cephALEXin (KEFLEX) 500 MG capsule, Take 1 capsule (500 mg) by mouth 4 times daily for 7 days, Disp: 28 capsule, Rfl: 0     amphetamine-dextroamphetamine (ADDERALL XR) 30 MG 24 hr capsule, Take 1 capsule (30 mg) by mouth daily, Disp: 30 capsule, Rfl: 0     amphetamine-dextroamphetamine (ADDERALL XR) 5 MG 24 hr capsule, Take 1 capsule (5 mg) by mouth daily Take with 30 mg pill for total of 35 mg daily., Disp: 30 capsule, Rfl: 0     triamcinolone (KENALOG) 0.1 % cream, [TRIAMCINOLONE (KENALOG) 0.1 % CREAM] Apply to skin rash twice daily (Patient not taking: Reported on 6/9/2022), Disp: 30 g, Rfl: 2    ALLERGIES:  No Known Allergies    FAMILY HISTORY:  History reviewed. No pertinent family history.    SOCIAL HISTORY:   Social History     Socioeconomic History     Marital status: Single     Spouse name: None     Number of children: None     Years of education: None     Highest education level: None   Tobacco Use     Smoking status: Never Smoker     Smokeless tobacco: Never Used       PHYSICAL EXAM:    Vitals: /80   Pulse 78   Temp 98.6  F (37  C) (Oral)   Resp 18   Ht 1.829 m (6')   Wt 77.1 kg (170 lb)   SpO2 98%   BMI 23.06 kg/m     Constitutional: Well developed, well nourished. Comfortable appearing.  HEAD:Normocephalic, atraumatic,   Eyes: PERRLA, EOM intact, conjunctiva clear, no discharge  ENT: mucous membranes moist, nose normal.   Neck- Supple, gross ROM intact.  No JVD.  No palpable nodes.  Pulmonary: Clear to auscultation bilaterally, no respiratory  distress, no wheezing, speaks full sentences easily.  Chest: No chest wall tenderness  Cardiovascular: Normal heart rate, regular rhythm, no murmurs. No lower extremity edema, 2+ DP pulses.   GI: Soft, no tenderness to deep palpation in all quadrants, not distended, no masses.  No hepatosplenomegaly.  Musculoskeletal: Moving all 4 extremities intentionally and without pain. No obvious deformity. No calf tenderness.  Back: No CVA tenderness  Skin: 3 cm erythematous area on medial left knee with central excoriated area, patch of surrounding cellulitis over distal medial left thigh and anterior shin, mild tenderness in left inguinal area. Warm, dry, no rash.  Neurologic: Alert & oriented x 3, speech clear, moving all extremities spontaneously   Psychiatric: Affect normal, cooperative.    LAB:  All pertinent labs reviewed and interpreted.  Labs Ordered and Resulted from Time of ED Arrival to Time of ED Departure - No data to display    RADIOLOGY:  US Lower Extremity Venous Duplex Left   Final Result   IMPRESSION:   1.  No deep venous thrombosis in the left lower extremity.            PROCEDURES:   Procedures       I, Karen Munoz, am serving as a scribe to document services personally performed by Dr. Nakul Jay based on my observation and the provider's statements to me. INakul M.D. attest that Karen Munoz is acting in a scribe capacity, has observed my performance of the services and has documented them in accordance with my direction.      Nakul Jay M.D.  Emergency Medicine  [facility] EMERGENCY ROOM  [address] [facility] [address] [phone]  Dept: [phone]     Nakul Jay MD  07/21/22 0233

## 2022-08-01 ENCOUNTER — TELEPHONE (OUTPATIENT)
Dept: FAMILY MEDICINE | Facility: CLINIC | Age: 32
End: 2022-08-01

## 2022-08-01 DIAGNOSIS — F90.0 ATTENTION DEFICIT HYPERACTIVITY DISORDER (ADHD), PREDOMINANTLY INATTENTIVE TYPE: ICD-10-CM

## 2022-08-01 RX ORDER — DEXTROAMPHETAMINE SACCHARATE, AMPHETAMINE ASPARTATE MONOHYDRATE, DEXTROAMPHETAMINE SULFATE AND AMPHETAMINE SULFATE 7.5; 7.5; 7.5; 7.5 MG/1; MG/1; MG/1; MG/1
30 CAPSULE, EXTENDED RELEASE ORAL DAILY
Qty: 30 CAPSULE | Refills: 0 | Status: SHIPPED | OUTPATIENT
Start: 2022-08-01 | End: 2022-09-01

## 2022-08-01 RX ORDER — DEXTROAMPHETAMINE SACCHARATE, AMPHETAMINE ASPARTATE MONOHYDRATE, DEXTROAMPHETAMINE SULFATE AND AMPHETAMINE SULFATE 1.25; 1.25; 1.25; 1.25 MG/1; MG/1; MG/1; MG/1
5 CAPSULE, EXTENDED RELEASE ORAL DAILY
Qty: 30 CAPSULE | Refills: 0 | Status: SHIPPED | OUTPATIENT
Start: 2022-08-01 | End: 2022-09-01

## 2022-08-01 NOTE — TELEPHONE ENCOUNTER
Medication: adderall      CSA in last year: NO-  22    Random Utox in last year: YES  (if any of the above answer NO - schedule with PCP)     On opioids in addition to benzodiazepines? NO  (if the above answer YES - schedule with PCP every 6 months)           PROVIDER TO PULL THE FOLLOWING FROM  :    1. Last date filled?  2.   Only PCP Prescribing?  3.   Taken as prescribed from physician notes?

## 2022-08-01 NOTE — TELEPHONE ENCOUNTER
Reason for Call:  Medication or medication refill:    Do you use a [facility]?  Name of the pharmacy and phone number for the current request:  [email or web address]    Name of the medication requested:   amphetamine-dextroamphetamine (ADDERALL XR) 5 MG 24 hr capsule 30 capsule 0 7/1/2022  No   Sig - Route: Take 1 capsule (5 mg) by mouth daily Take with 30 mg pill for total of 35 mg daily. - Oral       amphetamine-dextroamphetamine (ADDERALL XR) 30 MG 24 hr capsule 30 capsule 0 7/1/2022  No   Sig - Route: Take 1 capsule (30 mg) by mouth daily - Oral     Other request: He did have an appt scheduled and missed it due to his son being born.  He did schedule another appt as well.    Can we leave a detailed message on this number? YES    Phone number patient can be reached at: Cell number on file:    No relevant phone numbers on file.       Best Time: any    Call taken on 8/1/2022 at 9:52 AM by Pam J. Behr

## 2022-08-01 NOTE — TELEPHONE ENCOUNTER
Missed scheduled visit I believe because his wife is in labor.  He does need a visit with me before further refills, and this can be virtual

## 2022-08-23 ENCOUNTER — OFFICE VISIT (OUTPATIENT)
Dept: FAMILY MEDICINE | Facility: CLINIC | Age: 32
End: 2022-08-23
Payer: COMMERCIAL

## 2022-08-23 VITALS
RESPIRATION RATE: 18 BRPM | DIASTOLIC BLOOD PRESSURE: 80 MMHG | TEMPERATURE: 98 F | HEIGHT: 72 IN | SYSTOLIC BLOOD PRESSURE: 120 MMHG | BODY MASS INDEX: 23.98 KG/M2 | HEART RATE: 79 BPM | OXYGEN SATURATION: 98 % | WEIGHT: 177 LBS

## 2022-08-23 DIAGNOSIS — Z79.899 CONTROLLED SUBSTANCE AGREEMENT SIGNED: ICD-10-CM

## 2022-08-23 DIAGNOSIS — F90.0 ATTENTION DEFICIT HYPERACTIVITY DISORDER (ADHD), PREDOMINANTLY INATTENTIVE TYPE: Primary | ICD-10-CM

## 2022-08-23 PROCEDURE — 99213 OFFICE O/P EST LOW 20 MIN: CPT | Performed by: FAMILY MEDICINE

## 2022-08-23 ASSESSMENT — PAIN SCALES - GENERAL: PAINLEVEL: NO PAIN (0)

## 2022-08-23 NOTE — PROGRESS NOTES
Assessment & Plan     Attention deficit hyperactivity disorder (ADHD), predominantly inattentive type  Well controlled on: on Adderall total of 35 mg daily - I can continue to refill this    Controlled substance agreement signed  Or above medication (Urine drug screen already done)    Declines Hep C and HIV screening - low risk    Return in about 6 months (around 2/23/2023) for may be evisit.    Luana Ochoa MD  [facility]    Subjective   Sergio is a 32 year old, presenting for the following health issues:  Recheck Medication (Med check )      History of Present Illness       Reason for visit:  Medication check up    ADHD - takes a total of 35 mg adderall XR daily (30 mg XR plus 5 mg XR) .  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 Just after son John was born, a nurse noticed swelling of knee and Sergio was sent down to ED for evaluation and treatment of cellulitis. They flew out to [place] when he son was 2.5 weeks old          He eats 2-3 servings of fruits and vegetables daily.He consumes 2 sweetened beverage(s) daily.He exercises with enough effort to increase his heart rate 60 or more minutes per day.  He exercises with enough effort to increase his heart rate 7 days per week.   He is taking medications regularly.      Objective    /80   Pulse 79   Temp 98  F (36.7  C)   Resp 18   Ht 1.829 m (6')   Wt 80.3 kg (177 lb)   SpO2 98%   BMI 24.01 kg/m    Body mass index is 24.01 kg/m .  Physical Exam   General appearance - alert, well appearing, and in no  distress  Mental status - normal mood, behavior, speech, dress, motor activity, and thought processes  Ears - bilateral TM's and external ear canals normal  Mouth - mucous membranes moist, pharynx normal without lesions  Neck - supple, no significant adenopathy  Chest - clear to auscultation, no wheezes, rales or rhonchi, symmetric air entry  Heart - normal rate, regular rhythm, normal S1, S2, no murmurs, rubs, clicks or gallops  Abdomen - soft, nontender, nondistended, no masses or organomegaly              .  ..

## 2022-08-23 NOTE — LETTER
[facility]  08/23/22  Patient: Sergio Cuadra  YOB: 1990  Medical Record Number: [id number]                                                                                  Non-Opioid Controlled Substance Agreement    Diagnosis: ADHD  Medication: Adderall XR 30, Adderall 5 mg IR   Quantity per month: 30/30  Number of refills before follow up visit: 6 months - may be e visit    This is an agreement between you and your provider regarding safe and appropriate use of controlled substances prescribed by your care team. Controlled substances are?medicines that can cause physical and mental dependence (abuse).     There are strict laws about having and using these medicines. We here at [facility] are  committed to working with you in your efforts to get better. To support you in this work, we'll help you schedule regular office appointments for medicine refills. If we must cancel or change your appointment for any reason, we'll make sure you have enough medicine to last until your next appointment.     As a Provider, I will:     Listen carefully to your concerns while treating you with respect.     Recommend a treatment plan that I believe is in your best interest and may involve therapies other than medicine.      Talk with you often about the possible benefits and the risk of harm of any medicine that we prescribe for you.    Assess the safety of this medicine and check how well it works.      Provide a plan on how to taper (discontinue or go off) using this medicine if the decision is made to stop its use.      ::  As a Patient, I understand controlled substances:       Are prescribed by my care provider to help me function or work and manage my condition(s).?    Are strong medicines and can cause serious side effects.       Need to be taken exactly as prescribed.?Combining controlled substances with certain medicines or chemicals (such as illegal drugs, alcohol, sedatives,  sleeping pills, and benzodiazepines) can be dangerous or even fatal.? If I stop taking my medicines suddenly, I may have severe withdrawal symptoms.     The risks, benefits, and side effects of these medicine(s) were explained to me. I agree that:    1. I will take part in other treatments as advised by my care team. This may be psychiatry or counseling, physical therapy, behavioral therapy, group treatment or a referral to specialist.  2. I will keep all my appointments and understand this is part of the monitoring of controlled substances.?My care team may require an office visit for EVERY controlled substance refill. If I miss appointments or don t follow instructions, my care team may stop my medicine    3. I will take my medicines as prescribed. I will not change the dose or schedule unless my care team tells me to. There will be no refills if I run out early.    4. I may be asked to come to the clinic and complete a urine drug test or complete a pill count. If I don t give a urine sample or participate in a pill count, the care team may stop my medicine.  5. I will only receive controlled substance prescriptions from this clinic. If I am treated by another provider, I will tell them that I am taking controlled substances and that I have a treatment agreement with this provider. I will inform my [facility] care team within one business day if I am given a prescription for any controlled substance by another healthcare provider. My [facility] care team can contact other providers and pharmacists about my use of any medicines.    6. It is up to me to make sure that I don't run out of my medicines on weekends or holidays.?If my care team is willing to refill my prescription without a visit, I must request refills only during office hours. Refills may take up to 3 business days to process. I will use one pharmacy to fill all my controlled substance prescriptions. I will notify the clinic about any  changes to my insurance or medicine availability.  7. I am responsible for my prescriptions. If the medicine/prescription is lost, stolen or destroyed, it will not be replaced.?I also agree not to share controlled substance medicines with anyone.   8. I am aware I should not use any illegal or recreational drugs. I agree not to drink alcohol unless my care team says I can.   9. If I enroll in the [place] Medical Cannabis program, I will tell my care team before my next refill.  10. I will tell my care team right away if I become pregnant, have a new medical problem treated outside of my regular clinic, or have a change in my medicines.   11. I understand that this medicine can affect my thinking, judgment and reaction time.? Alcohol and drugs affect the brain and body, which can affect the safety of my driving. Being under the influence of alcohol or drugs can affect my decision-making, behaviors, personal safety and the safety of others. Driving while impaired (DWI) can occur if a person is driving, operating or in physical control of a car, motorcycle, boat, snowmobile, ATV, motorbike, off-road vehicle or any other motor vehicle ([place] Statute 169A.20). I understand the risk if I choose to drive or operate any vehicle or machinery.    I understand that if I do not follow any of the conditions above, my prescriptions or treatment may be stopped or changed.   I agree that my provider, clinic care team and pharmacy may work with any city, state or federal law enforcement agency that investigates the misuse, sale or other diversion of my controlled medicine. I will allow my provider to discuss my care with, or share a copy of, this agreement with any other treating provider, pharmacy or emergency room where I receive care.     I have read this agreement and have asked questions about anything I did not understand.    ________________________________________________________  Patient Signature - Sergio Cuadra      ___________________                   Date     ________________________________________________________  Provider Signature - Ajde [place], MD       ___________________                   Date     ________________________________________________________  Witness Signature (required if provider not present while patient signing)          ___________________                   Date

## 2022-08-29 ENCOUNTER — TELEPHONE (OUTPATIENT)
Dept: FAMILY MEDICINE | Facility: CLINIC | Age: 32
End: 2022-08-29

## 2022-08-29 DIAGNOSIS — F90.0 ATTENTION DEFICIT HYPERACTIVITY DISORDER (ADHD), PREDOMINANTLY INATTENTIVE TYPE: ICD-10-CM

## 2022-08-29 NOTE — TELEPHONE ENCOUNTER
Medication Question or Refill        What medication are you calling about (include dose and sig)?:   amphetamine-dextroamphetamine (ADDERALL XR) 30 MG 24 hr capsule 30 capsule 0 8/1/2022  No   Sig - Route: Take 1 capsule (30 mg) by mouth daily      amphetamine-dextroamphetamine (ADDERALL XR) 5 MG 24 hr capsule 30 capsule 0 8/1/2022  No   Sig - Route: Take 1 capsule (5 mg) by mouth daily Take with 30 mg pill for total of 35 mg daily. - Oral         Controlled Substance Agreement on file:   CSA -- Patient Level:    Controlled Substance Agreement - Non - Opioid - Scan on 5/24/2021  Controlled Substance Agreement - Non - Opioid - Scan on 4/23/2020: NON-OPIOID CONTROLLED SUBSTANCE AGREEMENT  Controlled Substance Agreement - Non - Opioid - Scan on 1/21/2019  Controlled Substance Agreement - Opioid - Scan on 12/15/2017  Controlled Substance Agreement - Opioid - Scan on 9/28/2016  Controlled Substance Agreement - Opioid - Scan on 11/9/2015       Who prescribed the medication?: Dr Ochoa    Do you need a refill? Yes:     When did you use the medication last? today    Patient offered an appointment? No    Do you have any questions or concerns?  No    Preferred Pharmacy:   [id number] [address] AT [facility] [address] [place] [address] [address]  [facility] [address]  Phone: [phone] Fax: [phone]      Could we send this information to you in [facility] or would you prefer to receive a phone call?:   Patient would prefer a phone call   Okay to leave a detailed message?: Yes at [phone]

## 2022-08-31 DIAGNOSIS — F90.0 ATTENTION DEFICIT HYPERACTIVITY DISORDER (ADHD), PREDOMINANTLY INATTENTIVE TYPE: ICD-10-CM

## 2022-08-31 NOTE — TELEPHONE ENCOUNTER
Medication Question or Refill        What medication are you calling about (include dose and sig)?:   amphetamine-dextroamphetamine (ADDERALL XR) 30 MG 24 hr capsule 30 capsule 0 8/1/2022  No     amphetamine-dextroamphetamine (ADDERALL XR) 5 MG 24 hr capsule 30 capsule 0 8/1/2022  No         Controlled Substance Agreement on file:   CSA -- Patient Level:    Controlled Substance Agreement - Non - Opioid - Scan on 5/24/2021  Controlled Substance Agreement - Non - Opioid - Scan on 4/23/2020: NON-OPIOID CONTROLLED SUBSTANCE AGREEMENT  Controlled Substance Agreement - Non - Opioid - Scan on 1/21/2019  Controlled Substance Agreement - Opioid - Scan on 12/15/2017  Controlled Substance Agreement - Opioid - Scan on 9/28/2016  Controlled Substance Agreement - Opioid - Scan on 11/9/2015           Do you need a refill? Yes: Will be out        Preferred Pharmacy:   [id number] - [place] - [address] AT [facility] [address] [place] [address] [address] [place] [address]  Phone: [phone] Fax: [phone]        Could we send this information to you in [facility] or would you prefer to receive a phone call?:   Patient would prefer a phone call   Okay to leave a detailed message?: Yes at Cell number on file:    No relevant phone numbers on file.

## 2022-09-01 RX ORDER — DEXTROAMPHETAMINE SACCHARATE, AMPHETAMINE ASPARTATE MONOHYDRATE, DEXTROAMPHETAMINE SULFATE AND AMPHETAMINE SULFATE 7.5; 7.5; 7.5; 7.5 MG/1; MG/1; MG/1; MG/1
30 CAPSULE, EXTENDED RELEASE ORAL DAILY
Qty: 30 CAPSULE | Refills: 0 | Status: SHIPPED | OUTPATIENT
Start: 2022-09-01 | End: 2022-11-02

## 2022-09-01 RX ORDER — DEXTROAMPHETAMINE SACCHARATE, AMPHETAMINE ASPARTATE MONOHYDRATE, DEXTROAMPHETAMINE SULFATE AND AMPHETAMINE SULFATE 1.25; 1.25; 1.25; 1.25 MG/1; MG/1; MG/1; MG/1
5 CAPSULE, EXTENDED RELEASE ORAL DAILY
Qty: 30 CAPSULE | Refills: 0 | Status: SHIPPED | OUTPATIENT
Start: 2022-09-01

## 2022-09-01 NOTE — TELEPHONE ENCOUNTER
08/01/2022  1   08/01/2022  Dextroamp-Amphet Er 30 MG Cap    30.00  30 Ma Win   [id number]   Wal ([id number])   [place]   08/01/2022  1   08/01/2022  Dextroamp-Amphet Er 5 MG Cap    30.00  30 Ma Win   [id number]   Wal ([id number][place]   [place]   07/01/2022  1   06/29/2022  Dextroamp-Amphet Er 30 MG Cap    30.00  30 Ma Win   [id number]   Wal ([id number][place]   [place]   07/01/2022  1   06/29/2022  Dextroamp-Amphet Er 5 MG Cap    30.00  30 Ma Win   [id number]   Wal ([id number][place]   [place]   06/02/2022  1   06/01/2022  Dextroamp-Amphet Er 30 MG Cap    30.00  30 Je Omar   [id number]   Wal ([id number])   0/0   [place]     Last visit 8/23/22

## 2022-09-02 RX ORDER — DEXTROAMPHETAMINE SACCHARATE, AMPHETAMINE ASPARTATE MONOHYDRATE, DEXTROAMPHETAMINE SULFATE AND AMPHETAMINE SULFATE 7.5; 7.5; 7.5; 7.5 MG/1; MG/1; MG/1; MG/1
30 CAPSULE, EXTENDED RELEASE ORAL DAILY
Qty: 30 CAPSULE | Refills: 0 | Status: CANCELLED | OUTPATIENT
Start: 2022-09-02

## 2022-09-02 RX ORDER — DEXTROAMPHETAMINE SACCHARATE, AMPHETAMINE ASPARTATE MONOHYDRATE, DEXTROAMPHETAMINE SULFATE AND AMPHETAMINE SULFATE 1.25; 1.25; 1.25; 1.25 MG/1; MG/1; MG/1; MG/1
5 CAPSULE, EXTENDED RELEASE ORAL DAILY
Qty: 30 CAPSULE | Refills: 0 | Status: CANCELLED | OUTPATIENT
Start: 2022-09-02

## 2022-09-25 ENCOUNTER — HEALTH MAINTENANCE LETTER (OUTPATIENT)
Age: 32
End: 2022-09-25

## 2022-10-03 ENCOUNTER — TELEPHONE (OUTPATIENT)
Dept: FAMILY MEDICINE | Facility: CLINIC | Age: 32
End: 2022-10-03

## 2022-10-03 DIAGNOSIS — F90.0 ATTENTION DEFICIT HYPERACTIVITY DISORDER (ADHD), PREDOMINANTLY INATTENTIVE TYPE: Primary | ICD-10-CM

## 2022-10-03 DIAGNOSIS — Z79.899 CONTROLLED SUBSTANCE AGREEMENT SIGNED: ICD-10-CM

## 2022-10-03 RX ORDER — DEXTROAMPHETAMINE SACCHARATE, AMPHETAMINE ASPARTATE MONOHYDRATE, DEXTROAMPHETAMINE SULFATE AND AMPHETAMINE SULFATE 7.5; 7.5; 7.5; 7.5 MG/1; MG/1; MG/1; MG/1
30 CAPSULE, EXTENDED RELEASE ORAL DAILY
Qty: 30 CAPSULE | Refills: 0 | Status: SHIPPED | OUTPATIENT
Start: 2022-10-03 | End: 2022-11-02

## 2022-10-03 RX ORDER — DEXTROAMPHETAMINE SACCHARATE, AMPHETAMINE ASPARTATE, DEXTROAMPHETAMINE SULFATE AND AMPHETAMINE SULFATE 1.25; 1.25; 1.25; 1.25 MG/1; MG/1; MG/1; MG/1
5 TABLET ORAL DAILY
Qty: 30 TABLET | Refills: 0 | Status: SHIPPED | OUTPATIENT
Start: 2022-10-03 | End: 2022-11-02

## 2022-10-03 RX ORDER — DEXTROAMPHETAMINE SACCHARATE, AMPHETAMINE ASPARTATE, DEXTROAMPHETAMINE SULFATE AND AMPHETAMINE SULFATE 1.25; 1.25; 1.25; 1.25 MG/1; MG/1; MG/1; MG/1
5 TABLET ORAL DAILY
Qty: 30 TABLET | Refills: 0 | Status: SHIPPED | OUTPATIENT
Start: 2022-12-04 | End: 2022-11-02

## 2022-10-03 RX ORDER — DEXTROAMPHETAMINE SACCHARATE, AMPHETAMINE ASPARTATE, DEXTROAMPHETAMINE SULFATE AND AMPHETAMINE SULFATE 1.25; 1.25; 1.25; 1.25 MG/1; MG/1; MG/1; MG/1
5 TABLET ORAL DAILY
Qty: 30 TABLET | Refills: 0 | Status: SHIPPED | OUTPATIENT
Start: 2022-11-03 | End: 2022-12-03

## 2022-10-03 RX ORDER — DEXTROAMPHETAMINE SACCHARATE, AMPHETAMINE ASPARTATE MONOHYDRATE, DEXTROAMPHETAMINE SULFATE AND AMPHETAMINE SULFATE 7.5; 7.5; 7.5; 7.5 MG/1; MG/1; MG/1; MG/1
30 CAPSULE, EXTENDED RELEASE ORAL DAILY
Qty: 30 CAPSULE | Refills: 0 | Status: SHIPPED | OUTPATIENT
Start: 2022-12-04 | End: 2023-01-03

## 2022-10-03 RX ORDER — DEXTROAMPHETAMINE SACCHARATE, AMPHETAMINE ASPARTATE MONOHYDRATE, DEXTROAMPHETAMINE SULFATE AND AMPHETAMINE SULFATE 7.5; 7.5; 7.5; 7.5 MG/1; MG/1; MG/1; MG/1
30 CAPSULE, EXTENDED RELEASE ORAL DAILY
Qty: 30 CAPSULE | Refills: 0 | Status: SHIPPED | OUTPATIENT
Start: 2022-11-03 | End: 2022-12-03

## 2022-10-03 NOTE — TELEPHONE ENCOUNTER
Medication Question or Refill        What medication are you calling about (include dose and sig)?:   Adderall - pt is out of this medication    Controlled Substance Agreement on file:   CSA -- Patient Level:    Controlled Substance Agreement - Non - Opioid - Scan on 5/24/2021  Controlled Substance Agreement - Non - Opioid - Scan on 4/23/2020: NON-OPIOID CONTROLLED SUBSTANCE AGREEMENT  Controlled Substance Agreement - Non - Opioid - Scan on 1/21/2019  Controlled Substance Agreement - Opioid - Scan on 12/15/2017  Controlled Substance Agreement - Opioid - Scan on 9/28/2016  Controlled Substance Agreement - Opioid - Scan on 11/9/2015       Who prescribed the medication?: n/a    Do you need a refill? Yes:     When did you use the medication last? n/a    Patient offered an appointment? No    Do you have any questions or concerns?  No    Preferred Pharmacy:   [id number] [place] [address] AT [place] [address] [address] [facility] [address]  Phone: [phone] Fax: [phone]        Could we send this information to you in [facility] or would you prefer to receive a phone call?:   Patient would prefer a phone call   Okay to leave a detailed message?: Yes at Home number on file [phone] (home)

## 2022-10-03 NOTE — TELEPHONE ENCOUNTER
Diagnosis: ADHD  Medication: Adderall XR 30, Adderall 5 mg IR   Quantity per month: 30/30  Number of refills before follow up visit: 6 months - may be e visit  Last CSA - 8/23/22  Utox 6/20/22 09/02/2022  1   09/01/2022  Dextroamp-Amphet Er 30 MG Cap    30.00  30 Ma Win   [id number]   Wal ([id number][place]   [place]   09/02/2022  1   09/01/2022  Dextroamp-Amphet Er 5 MG Cap    30.00  30 Ma Win   [id number]   Wal ([id number][place]   [place]   08/01/2022  1   08/01/2022  Dextroamp-Amphet Er 5 MG Cap    30.00  30 Ma Win   [id number]   Wal ([id number])   [place]   08/01/2022  1   08/01/2022  Dextroamp-Amphet Er 30 MG Cap    30.00  30 Ma Win   [id number]   Wal ([id number])   [place]/0   [place]     THree month supply sent

## 2022-11-01 DIAGNOSIS — F90.0 ATTENTION DEFICIT HYPERACTIVITY DISORDER (ADHD), PREDOMINANTLY INATTENTIVE TYPE: ICD-10-CM

## 2022-11-01 NOTE — TELEPHONE ENCOUNTER
Reason for Call:  Medication or medication refill:    Do you use a [facility]?  Name of the pharmacy and phone number for the current request:  [id number] - [place], [place] - [address] AT [facility] "[employer] (Ph: [phone])    Name of the medication requested: Adderall 30MG and 5MG    Other request: none    Can we leave a detailed message on this number? YES    Phone number patient can be reached at: Home number on file [phone] (home)    Best Time: any    Call taken on 11/1/2022 at 9:41 AM by Lucy Calhoun

## 2022-11-02 RX ORDER — DEXTROAMPHETAMINE SACCHARATE, AMPHETAMINE ASPARTATE MONOHYDRATE, DEXTROAMPHETAMINE SULFATE AND AMPHETAMINE SULFATE 7.5; 7.5; 7.5; 7.5 MG/1; MG/1; MG/1; MG/1
30 CAPSULE, EXTENDED RELEASE ORAL DAILY
Qty: 30 CAPSULE | Refills: 0 | Status: SHIPPED | OUTPATIENT
Start: 2022-11-02

## 2022-11-02 RX ORDER — DEXTROAMPHETAMINE SACCHARATE, AMPHETAMINE ASPARTATE, DEXTROAMPHETAMINE SULFATE AND AMPHETAMINE SULFATE 1.25; 1.25; 1.25; 1.25 MG/1; MG/1; MG/1; MG/1
5 TABLET ORAL DAILY
Qty: 30 TABLET | Refills: 0 | Status: SHIPPED | OUTPATIENT
Start: 2022-12-04

## 2022-11-03 NOTE — TELEPHONE ENCOUNTER
Diagnosis: ADHD  Medication: Adderall XR 30, Adderall 5 mg IR   Quantity per month: 30/30  Number of refills before follow up visit: 6 months - may be e visit  Last CSA - 8/23/22  Utox 6/20/22    10/03/2022  2   10/03/2022  Dextroamp-Amphetamine 5 MG Tab  30.00  30 Ma Win   [id number]   [facility] (7150[place]   0/0   [place]   10/03/2022  2   10/03/2022  Dextroamp-Amphet Er 30 MG Cap  30.00  30 Ma Win   [id number]   [facility] (7150[place]   [place]   09/02/2022  2   09/01/2022  Dextroamp-Amphet Er 30 MG Cap  30.00  30 Ma Win   [id number]   [facility] (7150[place]   [place]   09/02/2022  2   09/01/2022  Dextroamp-Amphet Er 5 MG Cap  30.00  30 Ma Win   [id number]   [facility] (7150[place]   0/0   [place]

## 2023-03-14 NOTE — TELEPHONE ENCOUNTER
Has upcoming appt     Pt presents for lab specimen collection via port a cath  Port accessed, labs drawn, saline locked and de accessed without difficulty  Declined AVS, next appointment reviewed

## 2023-07-12 NOTE — TELEPHONE ENCOUNTER
Patient is scheduled for a virtual visit for medication check up with Dr. Ochoa on 12/16/21.        Medication: Adderall XR 5 mg  Last Date Filled 11/3/21    Medication: Adderall XR 30 mg  Last Date Filled 11/3/21     pulled: PROVIDER TO PULL FROM Epic.     Only PCP Prescribing? PROVIDER TO PULL  FROM [facility].    CSA in last year: YES  Random Utox in last year: YES  Opioids + benzodiazepines? NO    Is patient on the Executive Review Team? NO        (M6) obeys commands

## 2023-11-22 ENCOUNTER — PATIENT OUTREACH (OUTPATIENT)
Dept: CARE COORDINATION | Facility: CLINIC | Age: 33
End: 2023-11-22
Payer: COMMERCIAL

## 2023-12-06 ENCOUNTER — PATIENT OUTREACH (OUTPATIENT)
Dept: CARE COORDINATION | Facility: CLINIC | Age: 33
End: 2023-12-06
Payer: COMMERCIAL

## 2024-03-02 ENCOUNTER — HEALTH MAINTENANCE LETTER (OUTPATIENT)
Age: 34
End: 2024-03-02

## 2025-03-15 ENCOUNTER — HEALTH MAINTENANCE LETTER (OUTPATIENT)
Age: 35
End: 2025-03-15